# Patient Record
Sex: FEMALE | Race: WHITE | NOT HISPANIC OR LATINO | Employment: OTHER
[De-identification: names, ages, dates, MRNs, and addresses within clinical notes are randomized per-mention and may not be internally consistent; named-entity substitution may affect disease eponyms.]

---

## 2018-10-11 ENCOUNTER — RECORDS - HEALTHEAST (OUTPATIENT)
Dept: ADMINISTRATIVE | Facility: OTHER | Age: 63
End: 2018-10-11

## 2019-10-24 ENCOUNTER — OFFICE VISIT - HEALTHEAST (OUTPATIENT)
Dept: INTERNAL MEDICINE | Facility: CLINIC | Age: 64
End: 2019-10-24

## 2019-10-24 DIAGNOSIS — Z00.00 ROUTINE GENERAL MEDICAL EXAMINATION AT A HEALTH CARE FACILITY: ICD-10-CM

## 2019-10-24 DIAGNOSIS — Z23 ENCOUNTER FOR HERPES ZOSTER VACCINATION: ICD-10-CM

## 2019-10-24 DIAGNOSIS — Z12.2 ENCOUNTER FOR SCREENING FOR LUNG CANCER: ICD-10-CM

## 2019-10-24 DIAGNOSIS — Z72.0 TOBACCO ABUSE: ICD-10-CM

## 2019-10-24 DIAGNOSIS — E78.5 HYPERLIPIDEMIA LDL GOAL <100: ICD-10-CM

## 2019-10-24 DIAGNOSIS — Z78.0 MENOPAUSE: ICD-10-CM

## 2019-10-24 DIAGNOSIS — M41.35 THORACOGENIC SCOLIOSIS OF THORACOLUMBAR REGION: ICD-10-CM

## 2019-10-24 DIAGNOSIS — R05.3 CHRONIC COUGH: ICD-10-CM

## 2019-10-24 DIAGNOSIS — M54.16 LUMBAR RADICULOPATHY: ICD-10-CM

## 2019-10-24 LAB
ALBUMIN SERPL-MCNC: 4.1 G/DL (ref 3.5–5)
ALP SERPL-CCNC: 94 U/L (ref 45–120)
ALT SERPL W P-5'-P-CCNC: 38 U/L (ref 0–45)
ANION GAP SERPL CALCULATED.3IONS-SCNC: 10 MMOL/L (ref 5–18)
AST SERPL W P-5'-P-CCNC: 23 U/L (ref 0–40)
BASOPHILS # BLD AUTO: 0 THOU/UL (ref 0–0.2)
BASOPHILS NFR BLD AUTO: 1 % (ref 0–2)
BILIRUB SERPL-MCNC: 0.6 MG/DL (ref 0–1)
BUN SERPL-MCNC: 13 MG/DL (ref 8–22)
CALCIUM SERPL-MCNC: 9.7 MG/DL (ref 8.5–10.5)
CHLORIDE BLD-SCNC: 104 MMOL/L (ref 98–107)
CHOLEST SERPL-MCNC: 274 MG/DL
CO2 SERPL-SCNC: 27 MMOL/L (ref 22–31)
CREAT SERPL-MCNC: 0.77 MG/DL (ref 0.6–1.1)
EOSINOPHIL # BLD AUTO: 0.3 THOU/UL (ref 0–0.4)
EOSINOPHIL NFR BLD AUTO: 4 % (ref 0–6)
ERYTHROCYTE [DISTWIDTH] IN BLOOD BY AUTOMATED COUNT: 12.2 % (ref 11–14.5)
FASTING STATUS PATIENT QL REPORTED: YES
GFR SERPL CREATININE-BSD FRML MDRD: >60 ML/MIN/1.73M2
GLUCOSE BLD-MCNC: 88 MG/DL (ref 70–125)
HCT VFR BLD AUTO: 43.4 % (ref 35–47)
HDLC SERPL-MCNC: 82 MG/DL
HGB BLD-MCNC: 14.8 G/DL (ref 12–16)
LDLC SERPL CALC-MCNC: 175 MG/DL
LYMPHOCYTES # BLD AUTO: 1.3 THOU/UL (ref 0.8–4.4)
LYMPHOCYTES NFR BLD AUTO: 19 % (ref 20–40)
MCH RBC QN AUTO: 31.7 PG (ref 27–34)
MCHC RBC AUTO-ENTMCNC: 34.2 G/DL (ref 32–36)
MCV RBC AUTO: 93 FL (ref 80–100)
MONOCYTES # BLD AUTO: 0.6 THOU/UL (ref 0–0.9)
MONOCYTES NFR BLD AUTO: 9 % (ref 2–10)
NEUTROPHILS # BLD AUTO: 4.5 THOU/UL (ref 2–7.7)
NEUTROPHILS NFR BLD AUTO: 67 % (ref 50–70)
PLATELET # BLD AUTO: 298 THOU/UL (ref 140–440)
PMV BLD AUTO: 7.7 FL (ref 7–10)
POTASSIUM BLD-SCNC: 4.4 MMOL/L (ref 3.5–5)
PROT SERPL-MCNC: 6.6 G/DL (ref 6–8)
RBC # BLD AUTO: 4.67 MILL/UL (ref 3.8–5.4)
SODIUM SERPL-SCNC: 141 MMOL/L (ref 136–145)
TRIGL SERPL-MCNC: 87 MG/DL
TSH SERPL DL<=0.005 MIU/L-ACNC: 2.67 UIU/ML (ref 0.3–5)
WBC: 6.7 THOU/UL (ref 4–11)

## 2019-10-24 ASSESSMENT — MIFFLIN-ST. JEOR: SCORE: 1409.71

## 2019-10-25 ENCOUNTER — COMMUNICATION - HEALTHEAST (OUTPATIENT)
Dept: INTERNAL MEDICINE | Facility: CLINIC | Age: 64
End: 2019-10-25

## 2019-10-25 ENCOUNTER — HOSPITAL ENCOUNTER (OUTPATIENT)
Dept: MAMMOGRAPHY | Facility: CLINIC | Age: 64
Discharge: HOME | End: 2019-10-25
Attending: INTERNAL MEDICINE

## 2019-10-25 DIAGNOSIS — Z78.0 MENOPAUSE: ICD-10-CM

## 2019-10-25 DIAGNOSIS — E78.5 HYPERLIPIDEMIA LDL GOAL <100: ICD-10-CM

## 2019-10-25 LAB — HCV AB SERPL QL IA: NEGATIVE

## 2019-10-30 ENCOUNTER — HOSPITAL ENCOUNTER (OUTPATIENT)
Dept: CT IMAGING | Facility: HOSPITAL | Age: 64
Discharge: HOME | End: 2019-10-30
Attending: INTERNAL MEDICINE

## 2019-10-30 ENCOUNTER — COMMUNICATION - HEALTHEAST (OUTPATIENT)
Dept: INTERNAL MEDICINE | Facility: CLINIC | Age: 64
End: 2019-10-30

## 2019-10-30 DIAGNOSIS — Z12.2 ENCOUNTER FOR SCREENING FOR LUNG CANCER: ICD-10-CM

## 2019-11-01 ENCOUNTER — RECORDS - HEALTHEAST (OUTPATIENT)
Dept: ADMINISTRATIVE | Facility: OTHER | Age: 64
End: 2019-11-01

## 2019-11-01 ENCOUNTER — RECORDS - HEALTHEAST (OUTPATIENT)
Dept: BONE DENSITY | Facility: CLINIC | Age: 64
End: 2019-11-01

## 2019-11-01 DIAGNOSIS — Z78.0 ASYMPTOMATIC MENOPAUSAL STATE: ICD-10-CM

## 2019-11-04 ENCOUNTER — COMMUNICATION - HEALTHEAST (OUTPATIENT)
Dept: INTERNAL MEDICINE | Facility: CLINIC | Age: 64
End: 2019-11-04

## 2019-12-16 ENCOUNTER — COMMUNICATION - HEALTHEAST (OUTPATIENT)
Dept: INTERNAL MEDICINE | Facility: CLINIC | Age: 64
End: 2019-12-16

## 2019-12-16 DIAGNOSIS — Z23 NEED FOR SHINGLES VACCINE: ICD-10-CM

## 2019-12-17 ENCOUNTER — AMBULATORY - HEALTHEAST (OUTPATIENT)
Dept: NURSING | Facility: CLINIC | Age: 64
End: 2019-12-17

## 2019-12-17 DIAGNOSIS — Z23 NEED FOR SHINGLES VACCINE: ICD-10-CM

## 2020-07-31 ENCOUNTER — OFFICE VISIT - HEALTHEAST (OUTPATIENT)
Dept: FAMILY MEDICINE | Facility: CLINIC | Age: 65
End: 2020-07-31

## 2020-07-31 DIAGNOSIS — I10 BENIGN ESSENTIAL HYPERTENSION: ICD-10-CM

## 2020-07-31 DIAGNOSIS — B96.89 BACTERIAL SINUSITIS: ICD-10-CM

## 2020-07-31 DIAGNOSIS — J32.9 BACTERIAL SINUSITIS: ICD-10-CM

## 2020-10-28 ENCOUNTER — COMMUNICATION - HEALTHEAST (OUTPATIENT)
Dept: PULMONOLOGY | Facility: OTHER | Age: 65
End: 2020-10-28

## 2020-11-08 ENCOUNTER — COMMUNICATION - HEALTHEAST (OUTPATIENT)
Dept: INTERNAL MEDICINE | Facility: CLINIC | Age: 65
End: 2020-11-08

## 2020-11-08 DIAGNOSIS — E78.5 HYPERLIPIDEMIA LDL GOAL <100: ICD-10-CM

## 2020-11-10 RX ORDER — ATORVASTATIN CALCIUM 20 MG/1
TABLET, FILM COATED ORAL
Qty: 30 TABLET | Refills: 11 | Status: SHIPPED | OUTPATIENT
Start: 2020-11-10 | End: 2022-01-05

## 2020-11-25 ENCOUNTER — COMMUNICATION - HEALTHEAST (OUTPATIENT)
Dept: PULMONOLOGY | Facility: OTHER | Age: 65
End: 2020-11-25

## 2021-05-25 ENCOUNTER — RECORDS - HEALTHEAST (OUTPATIENT)
Dept: ADMINISTRATIVE | Facility: CLINIC | Age: 66
End: 2021-05-25

## 2021-05-26 ENCOUNTER — RECORDS - HEALTHEAST (OUTPATIENT)
Dept: ADMINISTRATIVE | Facility: CLINIC | Age: 66
End: 2021-05-26

## 2021-05-27 VITALS
HEART RATE: 63 BPM | OXYGEN SATURATION: 97 % | RESPIRATION RATE: 16 BRPM | TEMPERATURE: 98.9 F | SYSTOLIC BLOOD PRESSURE: 154 MMHG | DIASTOLIC BLOOD PRESSURE: 87 MMHG

## 2021-05-28 ENCOUNTER — RECORDS - HEALTHEAST (OUTPATIENT)
Dept: ADMINISTRATIVE | Facility: CLINIC | Age: 66
End: 2021-05-28

## 2021-05-29 ENCOUNTER — RECORDS - HEALTHEAST (OUTPATIENT)
Dept: ADMINISTRATIVE | Facility: CLINIC | Age: 66
End: 2021-05-29

## 2021-05-31 ENCOUNTER — RECORDS - HEALTHEAST (OUTPATIENT)
Dept: ADMINISTRATIVE | Facility: CLINIC | Age: 66
End: 2021-05-31

## 2021-06-02 NOTE — PATIENT INSTRUCTIONS - HE
1.  64-year-old woman who is reestablishing primary care at [facility] internal medicine after being away for a little over 3 years.  Issues are chronic cough with sputum production in the context of long history of cigarette smoking (at least 35 pack years), chronic low back pain in the context of lumbar disc degeneration and thoracolumbar scoliosis, right-sided lumbar radiculopathy, mild aortic insufficiency seen on echocardiogram January 2016, hyperlipidemia, post menopause, history of colon polyp, preventive health services.    She is fasting this morning, so we will get comprehensive metabolic panel, blood cell counts, TSH thyroid test, lipid profile, also screening hepatitis C serology..  Overdue for screening mammogram so this is ordered.    Vaccines were given today for Prevnar 13 and for tetanus.    Going issue by issue:    2.  Chronic cough sputum production in the context of long history of cigarette smoking of at least 35 pack years.    I believe she probably has chronic bronchitis from smoking, according to the description of her symptoms.  She said she is been coughing and bringing up phlegm for the past 6 months.  I do not think there is any acute infection going on.  Her lungs sound clear.  Oxygen saturation 97% which is normal.    Given her long smoking history, she meets criteria for low-dose lung CT scanning.  This is ordered.  I am also sending her for spirometry to quantify her pulmonary flows and volumes to see if she formally would be diagnosed with COPD.    She did receive the Prevnar 13 vaccination today along with her seasonal flu shot and tetanus booster.  In the next 6 months or so, I would want her to also get the pneumococcal 23 Valent vaccine.    With regards to medical management of her lungs, especially if we diagnosed COPD, of course I want her to stop smoking as soon as possible.  If she is bothered by shortness of breath symptoms, I would put her on an inhaler regimen  starting with tiotropium daily, with albuterol inhaler for rescue use.    3.  Tobacco use.  In 2016 Dr. Orin Cheung did prescribe Chantix, but it was extremely expensive.  I told Ms. Phillips that we could try prescribing Chantix again.  Alternatives are nicotine replacement with patches and/or gum.  Another alternative is bupropion (also known as Wellbutrin or Zyban).    I did ask her to set a stop smoking date.  Her partner also was a smoker, and therefore this probably needs to be a joint project between the 2.    4.  Chronic low back pain in the context of lumbar disc degeneration, thoracolumbar scoliosis, and chronic right-sided lumbar radiculopathy with history of right L5 microdiscectomy performed in 2015.    She has some weakness of her right foot dorsiflexors, which fits with her description of a mild foot drop.  Otherwise she has pretty good strength in her lower extremities.  Her pain is concentrated mostly across her low back.  She has had 2 MRI studies showing extensive degenerative disc disease and facet arthropathy.  Most recent MRI was done at [facility] orthopedics in 2018.  I do not have that report, but I expect it shows similar degenerative changes to what was seen in 2015.    I told her that the best long-term solution for her back is to strengthen her core postural muscles.  Although she is been to physical therapy for her back before, I want to send her again, so that she can re-learn these exercises.  Is also important for her to stop smoking, because smoking harms bone density and remodeling, and likely accelerates degenerative changes.    She does take occasional ibuprofen or naproxen which is fine.  Other helpful measures are massage, moist heat, stretching, and sleeping on a supportive surface.    5.  Mild aortic insufficiency seen on echocardiogram January 2016.  I did not hear an AI murmur today.  At some point we might get another echocardiogram to recheck.    Satisfactory blood pressure  noted today.    6.  Hyperlipidemia with elevated LDL cholesterol of around 150.  Will recheck today.    7.  Post menopause, due for screening mammography.  Menses stopped in her early 50s.  Pap smears were normal in 2013 in 2016, she recalls never having had abnormal Pap smear.  I believe she can stop Pap screening.  No problems with vaginal bleeding or spotting.  We have also ordered a bone density scan.    8.  History of serrated adenoma colon polyp seen in 2016, next colonoscopy 5 years after that which would be 2021.    9.  At risk for osteoporosis given long history of smoking and post menopause.  DEXA scan ordered.    10.  Benign seborrheic keratosis on her back.    11.  Zoster vaccine indicated, will give her Shingrix shot #1 (of 2) today.    12.  Overweight and probably a bit deconditioned.  I like to see her lose about 20 pounds.  She walks about 2 or 3 miles a day.  I would like her to add strength training, especially focused on her back.  That will build lean muscle mass and help with pain while also helping her lose weight, encourage better blood pressure, and also help with blood sugar and lipids.    I like to see her back in 1 month, focus on smoking cessation strategies, also decide on whether any inhalers are needed.

## 2021-06-02 NOTE — PROGRESS NOTES
Office Visit - New Patient   Anat Phillips   64 y.o.  female    Date of visit: 10/24/2019  Physician: Isiah Gallo MD     Assessment and Plan     1.  64-year-old woman who is reestablishing primary care at [facility] general internal medicine after being away for over 3 years.  Issues are chronic cough with sputum production in the context of long history of cigarette smoking (at least 35 pack years), chronic low back pain in the context of lumbar disc degeneration and thoracolumbar scoliosis, right-sided lumbar radiculopathy, mild aortic insufficiency seen on echocardiogram January 2016, hyperlipidemia, post menopause, history of colon polyp, preventive health services.    She is fasting this morning, so we will get comprehensive metabolic panel, blood cell counts, TSH thyroid test, lipid profile, also screening hepatitis C serology..  Overdue for screening mammogram so this is ordered.    Vaccines were given today for Prevnar 13 and for tetanus.    Going issue by issue:    2.  Chronic cough sputum production in the context of long history of cigarette smoking of at least 35 pack years.    I believe she probably has chronic bronchitis from smoking, according to the description of her symptoms.  She said she is been coughing and bringing up phlegm for the past 6 months.  I do not think there is any acute infection going on.  Her lungs sound clear.  Oxygen saturation 97% which is normal.    Given her long smoking history, she meets criteria for low-dose lung CT scanning.  This is ordered.  I am also sending her for spirometry to quantify her pulmonary flows and volumes to see if she formally would be diagnosed with COPD.    She did receive the Prevnar 13 vaccination today along with her seasonal flu shot and tetanus booster.  In the next 6 months or so, I would want her to also get the pneumococcal 23 Valent vaccine.    With regards to medical management of her lungs, especially if we diagnosed COPD, of course I  want her to stop smoking as soon as possible.  If she is bothered by shortness of breath symptoms, I would put her on an inhaler regimen starting with tiotropium daily, with albuterol inhaler for rescue use.    3.  Tobacco use.  In 2016 Dr. Orin Cheung did prescribe Chantix, but it was extremely expensive.  I told Ms. Phillips that we could try prescribing Chantix again.  Alternatives are nicotine replacement with patches and/or gum.  Another alternative is bupropion (also known as Wellbutrin or Zyban).    I did ask her to set a stop smoking date.  Her partner also was a smoker, and therefore this probably needs to be a joint project between the 2.    4.  Chronic low back pain in the context of lumbar disc degeneration, thoracolumbar scoliosis, and chronic right-sided lumbar radiculopathy with history of right L5 microdiscectomy performed in 2015.    She has some weakness of her right foot dorsiflexors, which fits with her description of a mild foot drop.  Otherwise she has pretty good strength in her lower extremities.  Her pain is concentrated mostly across her low back.  She has had 2 MRI studies showing extensive degenerative disc disease and facet arthropathy.  Most recent MRI was done at [facility] orthopedics in 2018.  I do not have that report, but I expect it shows similar degenerative changes to what was seen in 2015.    I told her that the best long-term solution for her back is to strengthen her core postural muscles.  Although she is been to physical therapy for her back before, I want to send her again, so that she can re-learn these exercises.  Is also important for her to stop smoking, because smoking harms bone density and remodeling, and likely accelerates degenerative changes.    She does take occasional ibuprofen or naproxen which is fine.  Other helpful measures are massage, moist heat, stretching, and sleeping on a supportive surface.    5.  Mild aortic insufficiency seen on echocardiogram  January 2016.  I did not hear an AI murmur today.  At some point we might get another echocardiogram to recheck.    Satisfactory blood pressure noted today.    6.  Hyperlipidemia with elevated LDL cholesterol of around 150.  Will recheck today.    7.  Post menopause, due for screening mammography.  Menses stopped in her early 50s.  Pap smears were normal in 2013 in 2016, she recalls never having had abnormal Pap smear.  I believe she can stop Pap screening.  No problems with vaginal bleeding or spotting.  We have also ordered a bone density scan.    8.  History of serrated adenoma colon polyp seen in 2016, next colonoscopy 5 years after that which would be 2021.    9.  At risk for osteoporosis given long history of smoking and post menopause.  DEXA scan ordered.    10.  Benign seborrheic keratosis on her back.    11.  Zoster vaccine indicated, will give her Shingrix shot #1 (of 2) today.    12.  Overweight and probably a bit deconditioned.  I like to see her lose about 20 pounds.  She walks about 2 or 3 miles a day.  I would like her to add strength training, especially focused on her back.  That will build lean muscle mass and help with pain while also helping her lose weight, encourage better blood pressure, and also help with blood sugar and lipids.    I like to see her back in 1 month, focus on smoking cessation strategies, also decide on whether any inhalers are needed.       Chief Complaint   Chief Complaint   Patient presents with     Establish Care     Coughing up yellow phlegm for about 6 months.        History of Present Illness   This 64 y.o. old woman who is reestablishing primary care at [facility] general internal medicine after being away for over 3 years.  Issues are chronic cough with sputum production in the context of long history of cigarette smoking (at least 35 pack years), chronic low back pain in the context of lumbar disc degeneration and thoracolumbar scoliosis, right-sided lumbar  "radiculopathy, mild aortic insufficiency seen on echocardiogram January 2016, hyperlipidemia, post menopause, history of colon polyp, preventive health services.    Last seen in [facility] general internal medicine January 21, 2016.  That was more than 3 years ago.    Cough and phlegm  Yellowish about 6 months  Smoking about a 1 ppd, since age 33 years  Did quit for a year in mid 2000's  Dr Orin Cheung had 2016 varenicline (CHANTIX STARTING MONTH BOX) 0.5 mg (11)- 1 mg (42) tablet; Give with meals and with a full glass of water.     Lumbar Disc Degeneration  \"Terrible\"  [facility]  MR LUMBAR SPINE WO CONTRAST  3/20/2015 3:43 PM  CONCLUSION:  1.  Multilevel degenerative changes in the lumbar spine, as detailed. Dextroconvex curvature of the lumbar spine.  2.  L4-L5 inferiorly extending posterior disc herniation eccentric to the posterior central through posterior right paracentral region with effect upon the right L5 nerve root. Correlate for right L5 nerve root symptoms. Mild to moderate central canal   stenosis.  Subsequently she had microdiscectomy March 2015    Date: 01/29/2016 Start  Indications  Abnormal ECG.   Summary   1. Normal left ventricular size and systolic performance. The ejection   fraction is estimated to be 60%.   2. There is mild aortic insufficiency.    No chest pain, or any symptoms to suggest angina or heart failure.    Hyperlipidemia with lipid profile from January 2016 having elevated cholesterol 251, , HDL 82, triglycerides 76.     She recalls more recent MRI [facility] 2018-- recalls similar degenerative changes, but nothing acute    Has persistent nerve damage Right Leg-- right side foot drop  Nowadays: has pain localized across low back  Pain in hips. Pain right foot  Pinch sensation behind left knee  Leg strength preserved  Walks 2-3 miles a day  Not currently doing any core strengthening exercise.  Occasional aleve and iburofen    Last mammogram 2016  Menses stopped age " 52  Last Pap January 21, 2016, previous one on May 2, 2013.  These were normal.  She told me that she has never had an abnormal Pap smear.    Had colonoscopy 2016, told her 5 years, due 2021  Colonoscopy March 29, 2016 with sessile serrated adenoma in the a sending colon measuring 6 mm    No gastrointestinal symptoms.    Immunization History   Administered Date(s) Administered     DT (pediatric) 08/15/2002     Hep A, historic 05/19/2008     Influenza high dose,seasonal,PF, 65+ yrs 10/24/2019     Influenza,seasonal quad, PF, =/> 6months 01/21/2016     Pneumo Conj 13-V (2010&after) 10/24/2019     Td,adult,historic,unspecified 05/19/2008     Tdap 05/19/2008, 10/24/2019     Review of Systems: A comprehensive review of systems was negative except as noted.     Medications and Allergies   Current Outpatient Medications   Medication Sig Dispense Refill     aspirin 81 mg chewable tablet Chew 81 mg daily.       cetirizine (ZYRTEC) 10 MG tablet Take 10 mg by mouth daily.       GLUC HCL/CSA/MARYANNE HY/HYALUR AC (JOINT SUPPORT ORAL) Take 1 capsule by mouth daily. Joint Support Complex Oral Capsule       Lactobacillus rhamnosus GG (CULTURELLE) 10-15 Billion cell capsule Take 1 capsule by mouth daily.       MULTIVITAMIN (MULTIPLE VITAMIN ORAL) Take 1 tablet by mouth daily.       pseudoephedrine (SUDAFED) 30 MG tablet Take 30 mg by mouth every 4 (four) hours as needed for congestion.       No current facility-administered medications for this visit.      Allergies   Allergen Reactions     Sulfa (Sulfonamide Antibiotics)         Family and Social History   Family History   Problem Relation Age of Onset     Heart disease Father      Heart disease Brother      Breast cancer Niece         Social History     Tobacco Use     Smoking status: Current Every Day Smoker     Packs/day: 0.50     Types: Cigarettes     Smokeless tobacco: Never Used   Substance Use Topics     Alcohol use: Yes     Alcohol/week: 5.8 standard drinks     Types: 7  "Standard drinks or equivalent per week     Frequency: 4 or more times a week     Drinks per session: 1 or 2     Drug use: No        Physical Exam   General Appearance:   Very pleasant, normal mental status, moderately overweight.    /82 (Patient Site: Right Arm, Patient Position: Sitting, Cuff Size: Adult Regular)   Pulse 73   Temp 98.3  F (36.8  C) (Oral)   Resp 16   Ht 5' 9.5\" (1.765 m)   Wt 178 lb (80.7 kg)   SpO2 97%   BMI 25.91 kg/m       General: Alert, in no distress  Skin: + Benign-appearing seborrheic keratosis left upper back  Eyes/nose/throat: Eyes without scleral icterus, eye movements normal, pupils equal and reactive, oropharynx clear, ears with normal TM's  MSK: Neck with good ROM  Lymphatic: Neck without adenopathy or masses  Endocrine: Thyroid with no nodules to palpation  Pulm: Lungs clear to auscultation bilaterally  Cardiac: Heart with regular rate and rhythm, no murmur or gallop  GI: Abdomen soft, nontender. No palpable enlargement of liver or spleen  MSK: Extremities no tenderness or edema  Neuro: Moves all extremities  + Very slight weakness right foot dorsiflexion  Psych: Alert, normal mental status. Normal affect and speech         Additional Information   I spent 60 minutes face time with the patient, with > 50% counseling, explaining and discussing with the patient the issues enumerated in the Assessment and Plan section of this note and answering questions. Afterwards, the patient was given a printout of the AVS, with attention to the content in the Patient Instruction section.       Isiah Gallo MD  Internal Medicine      "

## 2021-06-03 VITALS
SYSTOLIC BLOOD PRESSURE: 132 MMHG | HEIGHT: 70 IN | BODY MASS INDEX: 25.48 KG/M2 | WEIGHT: 178 LBS | RESPIRATION RATE: 16 BRPM | DIASTOLIC BLOOD PRESSURE: 82 MMHG | TEMPERATURE: 98.3 F | OXYGEN SATURATION: 97 % | HEART RATE: 73 BPM

## 2021-06-04 NOTE — TELEPHONE ENCOUNTER
Patient is coming in tomorrow for Shingles #2, last given 10/24/19. Will be 1 week early for the 2 month vaccine. Order pending. Are you okay to give early? Please advise, thanks.

## 2021-06-10 NOTE — PROGRESS NOTES
Assessment & Plan:       1. Bacterial sinusitis  amoxicillin-clavulanate (AUGMENTIN) 875-125 mg per tablet   2. Benign essential hypertension        Medical Decision Making  Patient presents with acute onset left-sided facial and jaw pain most consistent with bacterial sinusitis.  Despite patient noting a mild cough, feel like this is most likely secondary to postnasal drip.  I do not have any concerns for COVID-19 at this time.  Patient does have pain to percussion of the left maxillary sinus and obvious left-sided anterior cervical lymphadenopathy.  No other signs for a soft tissue neck abscess as the skin of the neck is nonerythematous, no significant swelling, and no increased increased warmth to touch.  Did also consider possible TMJ disease, however patient does not have significant pain with anterior palpation of the left ear canal while opening closing the mouth.  Further there are no significant signs for strep pharyngitis or peritonsillar abscess on exam.  Will treat patient with oral antibiotics.  Also recommend she continue her at home prescription of nasal steroids as well as over-the-counter 24-hour antihistamines.  Discussed signs of worsening symptoms and when to follow-up with PCP if no symptom improvement.    Patient initially had symptoms possibly consistent with COVID-19.  Thus, protective precautions were taken including appropriate facemask, face shield, gown, and gloves.    Patient Instructions   You were seen today for a sinus infection.    Symptoms management:  - May use Tylenol or Ibuprofen for discomfort and/or fever if present  - May try saline irrigation to relieve congestion (see instructions below)  - Use of nasal steroids (Flonase) as prescribed  - Take antibiotics as prescribed for the full course (even if symptoms have improved)    Reasons to come back for re-evaluation:  - Develop a fever of 100.4F or current fever worsens  - Troubles seeing or double vision  - Swelling or  redness around one or both eyes  - Sfiff neck  - Symptoms are not improving over the next 5 days    Buffered normal saline nasal irrigation   The benefits   1. Saline (saltwater) washes the mucus and irritants from your nose.   2. The sinus passages are moisturized.   3. Studies have also shown that a nasal irrigation improves cell function (the cells that move the mucus work better).   The recipe   Use a one-quart glass jar that is thoroughly cleansed.   You may use a large medical syringe (30 cc), water pick with an irrigation tip (preferred method), squeeze bottle, or Neti pot. Do not use a baby bulb syringe. The syringe or pick should be sterilized frequently or replaced every two to three weeks to avoid contamination and infection.   Fill with water that has been distilled, previously boiled, or otherwise sterilized. Plain tap water is not recommended, because it is not necessarily sterile.   Add 1 to 1  heaping teaspoons of pickling/roddy salt. Do not use table salt, because it contains a large number of additives.   Add 1 teaspoon of baking soda (pure bicarbonate).   Mix ingredients together, and store at room temperature. Discard after one week.   You may also make up a solution from premixed packets that are commercially prepared specifically for nasal irrigation.   The instructions   Irrigate your nose with saline one to two times per day.   If you have been told to use nasal medication, you should always use your saline solution first. The nasal medication is much more effective when sprayed onto clean nasal membranes, and the spray will reach deeper into the nose.   Pour the amount of fluid you plan to use into a clean bowl. Do not put your used syringe back into the storage container, because it contaminates your solution.   You may warm the solution slightly in the microwave, but be sure that the solution is not hot.   Bend over the sink (some people do this in the shower) and squirt the solution  into each side of your nose, aiming the stream toward the back of your head, not the top of your head. The solution should flow into one nostril and out of the other, but it will not harm you if you swallow a little.   Some people experience a little burning sensation the first few times they use buffered saline solution, but this usually goes away after they adapt to it.             Subjective:       Anat Phillips is a 65 y.o. female here for evaluation of left-sided jaw pain.  Onset of symptoms was 1 week ago gradual worsening since then.  Associated symptoms include occasional dry cough, sore throat, sinus congestion, swelling underneath the left side of the neck, and pain while opening the mouth.  Patient otherwise denies shortness of breath, fevers, and ear pain.  She notes that the congestion pressure feels worse over the left maxillary sinus.  She is still able to drink fluids and eat food as needed.    The following portions of the patient's history were reviewed and updated as appropriate: allergies, current medications and problem list.    Review of Systems  Pertinent items are noted in HPI.     Allergies  Allergies   Allergen Reactions     Sulfa (Sulfonamide Antibiotics)        Family History   Problem Relation Age of Onset     Heart disease Father      Heart disease Brother      Breast cancer Niece 30         at 32       Social History     Socioeconomic History     Marital status: Single     Spouse name: None     Number of children: None     Years of education: None     Highest education level: None   Occupational History     Employer: RETIRED   Social Needs     Financial resource strain: None     Food insecurity     Worry: None     Inability: None     Transportation needs     Medical: None     Non-medical: None   Tobacco Use     Smoking status: Current Every Day Smoker     Packs/day: 0.50     Types: Cigarettes     Smokeless tobacco: Never Used   Substance and Sexual Activity     Alcohol use: Yes      Alcohol/week: 5.8 standard drinks     Types: 7 Standard drinks or equivalent per week     Frequency: 4 or more times a week     Drinks per session: 1 or 2     Drug use: No     Sexual activity: None   Lifestyle     Physical activity     Days per week: None     Minutes per session: None     Stress: None   Relationships     Social connections     Talks on phone: None     Gets together: None     Attends Caodaism service: None     Active member of club or organization: None     Attends meetings of clubs or organizations: None     Relationship status: None     Intimate partner violence     Fear of current or ex partner: None     Emotionally abused: None     Physically abused: None     Forced sexual activity: None   Other Topics Concern     None   Social History Narrative     None         Objective:       /87 (Patient Site: Left Arm)   Pulse 63   Temp 98.9  F (37.2  C)   Resp 16   SpO2 97%   General appearance: alert, appears stated age, cooperative, no distress and non-toxic  Head: Normocephalic, without obvious abnormality, atraumatic, Sinus tender to percussion over the left maxillary sinus; no tenderness or swelling over the parotid, patient does have some mild tenderness in the inferior aspect of the middle left jaw  Ears: TMs intact with mild to moderate mucoid fluid and bulging, no erythema, external ears normal; no tenderness to anterior pressure applied to the ear canal opening closing the mouth  Nose: no discharge  Throat: Posterior pharynx is mildly erythematous, no tonsillar swelling or exudate, mucous membranes moist, lips and tongue normal  Neck: Moderate left-sided anterior cervical lymphadenopathy, supple, symmetrical, trachea midline  Lungs: clear to auscultation bilaterally  Heart: regular rate and rhythm, S1, S2 normal, no murmur, click, rub or gallop

## 2021-06-10 NOTE — PATIENT INSTRUCTIONS - HE
You were seen today for a sinus infection.    Symptoms management:  - May use Tylenol or Ibuprofen for discomfort and/or fever if present  - May try saline irrigation to relieve congestion (see instructions below)  - Use of nasal steroids (Flonase) as prescribed  - Take antibiotics as prescribed for the full course (even if symptoms have improved)    Reasons to come back for re-evaluation:  - Develop a fever of 100.4F or current fever worsens  - Troubles seeing or double vision  - Swelling or redness around one or both eyes  - Sfiff neck  - Symptoms are not improving over the next 5 days    Buffered normal saline nasal irrigation   The benefits   1. Saline (saltwater) washes the mucus and irritants from your nose.   2. The sinus passages are moisturized.   3. Studies have also shown that a nasal irrigation improves cell function (the cells that move the mucus work better).   The recipe   Use a one-quart glass jar that is thoroughly cleansed.   You may use a large medical syringe (30 cc), water pick with an irrigation tip (preferred method), squeeze bottle, or Neti pot. Do not use a baby bulb syringe. The syringe or pick should be sterilized frequently or replaced every two to three weeks to avoid contamination and infection.   Fill with water that has been distilled, previously boiled, or otherwise sterilized. Plain tap water is not recommended, because it is not necessarily sterile.   Add 1 to 1  heaping teaspoons of pickling/roddy salt. Do not use table salt, because it contains a large number of additives.   Add 1 teaspoon of baking soda (pure bicarbonate).   Mix ingredients together, and store at room temperature. Discard after one week.   You may also make up a solution from premixed packets that are commercially prepared specifically for nasal irrigation.   The instructions   Irrigate your nose with saline one to two times per day.   If you have been told to use nasal medication, you should always use your  saline solution first. The nasal medication is much more effective when sprayed onto clean nasal membranes, and the spray will reach deeper into the nose.   Pour the amount of fluid you plan to use into a clean bowl. Do not put your used syringe back into the storage container, because it contaminates your solution.   You may warm the solution slightly in the microwave, but be sure that the solution is not hot.   Bend over the sink (some people do this in the shower) and squirt the solution into each side of your nose, aiming the stream toward the back of your head, not the top of your head. The solution should flow into one nostril and out of the other, but it will not harm you if you swallow a little.   Some people experience a little burning sensation the first few times they use buffered saline solution, but this usually goes away after they adapt to it.

## 2021-06-12 NOTE — TELEPHONE ENCOUNTER
RN cannot approve Refill Request    RN can NOT refill this medication Protocol failed and NO refill given. Last office visit: Visit date not found Last Physical: 10/24/2019 Last MTM visit: Visit date not found Last visit same specialty: Visit date not found.  Next visit within 3 mo: Visit date not found  Next physical within 3 mo: Visit date not found      Sabine Anand, [facility] Triage/Med Refill 11/10/2020    Requested Prescriptions   Pending Prescriptions Disp Refills     atorvastatin (LIPITOR) 20 MG tablet [Pharmacy Med Name: Atorvastatin Calcium Oral Tablet 20 MG] 30 tablet 0     Sig: TAKE ONE TABLET BY MOUTH ONE TIME DAILY       Statins Refill Protocol (Hmg CoA Reductase Inhibitors) Failed - 11/8/2020  9:11 AM        Failed - PCP or prescribing provider visit in past 12 months      Last office visit with prescriber/PCP: Visit date not found OR same dept: Visit date not found OR same specialty: Visit date not found  Last physical: 10/24/2019 Last MTM visit: Visit date not found   Next visit within 3 mo: Visit date not found  Next physical within 3 mo: Visit date not found  Prescriber OR PCP: Isiah Gallo MD  Last diagnosis associated with med order: 1. Hyperlipidemia LDL goal <100  - atorvastatin (LIPITOR) 20 MG tablet [Pharmacy Med Name: Atorvastatin Calcium Oral Tablet 20 MG]; TAKE ONE TABLET BY MOUTH ONE TIME DAILY   Dispense: 30 tablet; Refill: 0    If protocol passes may refill for 12 months if within 3 months of last provider visit (or a total of 15 months).

## 2021-06-16 ENCOUNTER — OFFICE VISIT - HEALTHEAST (OUTPATIENT)
Dept: PODIATRY | Facility: CLINIC | Age: 66
End: 2021-06-16

## 2021-06-16 DIAGNOSIS — M21.371 RIGHT FOOT DROP: ICD-10-CM

## 2021-06-16 DIAGNOSIS — G62.9 PERIPHERAL POLYNEUROPATHY: ICD-10-CM

## 2021-06-16 PROBLEM — E78.5 HYPERLIPIDEMIA LDL GOAL <100: Status: ACTIVE | Noted: 2019-10-24

## 2021-06-16 PROBLEM — R05.3 CHRONIC COUGH: Status: ACTIVE | Noted: 2019-10-24

## 2021-06-16 PROBLEM — M54.16 LUMBAR RADICULOPATHY: Status: ACTIVE | Noted: 2019-10-24

## 2021-06-16 PROBLEM — Z72.0 TOBACCO ABUSE: Status: ACTIVE | Noted: 2019-10-24

## 2021-06-16 PROBLEM — M41.35 THORACOGENIC SCOLIOSIS OF THORACOLUMBAR REGION: Status: ACTIVE | Noted: 2019-10-24

## 2021-06-16 RX ORDER — ZINC GLUCONATE 50 MG
TABLET ORAL
Status: SHIPPED | COMMUNITY
Start: 2021-06-16 | End: 2022-06-09

## 2021-06-16 ASSESSMENT — MIFFLIN-ST. JEOR: SCORE: 1472.08

## 2021-06-21 NOTE — LETTER
Letter by Haylee Patel RN at      Author: Haylee Patel RN Service: -- Author Type: --    Filed:  Encounter Date: 11/25/2020 Status: (Other)         Anat Phillips  [address] [address]      11/25/20      Dear Anat,    Your annual lung cancer screening scan is overdue. We have attempted to reach you without success. If you remain interested please call your primary care provider to schedule an office visit so they can do the screening.  We will make no further attempts to call you to schedule at this point if we do not hear from you and assume you are no longer interested in the screening.     Sincerely,  [facility] ([facility]) Lung Cancer Screening Program

## 2021-06-21 NOTE — LETTER
Letter by Haylee Patel RN at      Author: Haylee Patel RN Service: -- Author Type: --    Filed:  Encounter Date: 10/28/2020 Status: (Other)       Anat MUÑIZ Phillips  [address] [address]      10/28/20      Dear Remington Coppola time for your yearly exam to check for lung cancer.   Please call your primary care doctor to schedule a brief visit so your Low Dose CT scan can be ordered at the time of that appointment. Your scan needs to be done within 30 days of your office visit.      You should have a yearly exam if:  Youre age 55 to 80 (55 to 77, if youre on Medicare)  Youve smoked a pack a day for at least 30 years (or 2 packs a day for at least 15 years)  If you no longer smoke, its been less than 15 years since you quit     These exams work best when done every year. They are good at finding lung cancer early, but they cant find all lung cancers. If you develop any symptoms (shortness of breath, chest pain, coughing up blood), call your doctor right away.     If you would like help to quit smoking, please talk with your doctor during your next visit. Or, call [employer] at [phone].        Sincerely,  [facility] ([facility]) Lung Cancer Screening Program

## 2021-06-26 ENCOUNTER — HEALTH MAINTENANCE LETTER (OUTPATIENT)
Age: 66
End: 2021-06-26

## 2021-06-26 NOTE — PATIENT INSTRUCTIONS - HE
Surgery Information    **ALL [facility] PAPERWORK IS TO BE FAXED -[phone], IT WILL BE PROCESSED AFTER SURGERY IS COMPLETE.        Please call one of the [facility] locations below to schedule an appointment. If you received a prescription please bring it with you to your appointment. Some locations are limited to what they carry.    Office Locations    [facility]  [address] [place] [address]  Home Medical Equipment, [address]   Phone: [phone]   Orthotics and Prosthetics, [address]   Phone: [phone]    [facility]  Home Medical Equipment  [address], [place] [address]   Phone: [phone]    Orthotics and Prosthetics ([facility])    [address], [place] [address]  Phone: [phone]    [facility]  [address]   [place] [address]   Phone: [phone]    [facility]  [address]  [place] [address]  Phone: [phone]    [facility]   [address]  [place] [address]  Phone: [phone]    [facility]  [address]  [place] [address]  Phone: [phone]    [facility]  [address]  [address]  [place] [address]  Phone: [phone]    [place]   [address] [place] [address]   Phone: [phone]

## 2021-06-26 NOTE — PROGRESS NOTES
FOOT AND ANKLE SURGERY/PODIATRY CONSULT NOTE         ASSESSMENT:   Peripheral neuropathy right lower extremity      TREATMENT:  I informed the patient that her right foot pain is related to her problems with L4 and L5.  This is causing some peripheral neuropathy at the level of her right foot.  I informed the patient that I do recommend an AFO to assist with her gait due to the muscle weakness on the right lower extremity.  The patient is to return to the clinic as needed.  I informed the patient that there is no evidence of any ganglion cyst on the dorsal aspect of the right foot at this time.        HPI: Anat Phillips presented to the clinic today complaining of a recurrent ganglion cyst on the top of her right foot.  The patient also stated that she has pain involving the top of her right foot.  The pain can radiate into the arch of her right foot and across the top of her foot to the outside of her right ankle.  She indicated that this pain is intermittent.  It is mildly aggravated with weightbearing and ambulation.  She also indicated that she has some weakness of her right lower limb.  She has had problems with L4 and L5 in the past.  She had surgical procedures performed on her back.  Since that time she has numbness and weakening of her right lower leg.  It has altered her gait.  She denies any recent redness or swelling.  She is never had any trauma to her right lower extremity.  She stated that she has no pain while resting.    No past medical history on file.    Past Surgical History:   Procedure Laterality Date     L5 Radiculopathy  March 2015       Allergies   Allergen Reactions     Sulfa (Sulfonamide Antibiotics)          Current Outpatient Medications:      atorvastatin (LIPITOR) 20 MG tablet, TAKE ONE TABLET BY MOUTH ONE TIME DAILY , Disp: 30 tablet, Rfl: 11     cetirizine (ZYRTEC) 10 MG tablet, Take 10 mg by mouth daily., Disp: , Rfl:      MULTIVITAMIN (MULTIPLE VITAMIN ORAL), Take 1 tablet by mouth  daily., Disp: , Rfl:      aspirin 81 mg chewable tablet, Chew 81 mg daily., Disp: , Rfl:      calcium carbonate/vitamin D3 (CALCIUM 600 WITH VITAMIN D3 ORAL), , Disp: , Rfl:      GLUC HCL/CSA/MARYANNE HY/HYALUR AC (JOINT SUPPORT ORAL), Take 1 capsule by mouth daily. Joint Support Complex Oral Capsule, Disp: , Rfl:      Lactobacillus rhamnosus GG (CULTURELLE) 10-15 Billion cell capsule, Take 1 capsule by mouth daily., Disp: , Rfl:      pseudoephedrine (SUDAFED) 30 MG tablet, Take 30 mg by mouth every 4 (four) hours as needed for congestion., Disp: , Rfl:      zinc gluconate 50 mg tablet, , Disp: , Rfl:     Family History   Problem Relation Age of Onset     Heart disease Father      Heart disease Brother      Breast cancer Niece 30         at 32       Social History     Socioeconomic History     Marital status:      Spouse name: Not on file     Number of children: Not on file     Years of education: Not on file     Highest education level: Not on file   Occupational History     Employer: RETIRED   Social Needs     Financial resource strain: Not on file     Food insecurity     Worry: Not on file     Inability: Not on file     Transportation needs     Medical: Not on file     Non-medical: Not on file   Tobacco Use     Smoking status: Current Every Day Smoker     Packs/day: 0.50     Types: Cigarettes     Smokeless tobacco: Never Used   Substance and Sexual Activity     Alcohol use: Yes     Alcohol/week: 5.8 standard drinks     Types: 7 Standard drinks or equivalent per week     Frequency: 4 or more times a week     Drinks per session: 1 or 2     Drug use: No     Sexual activity: Not on file   Lifestyle     Physical activity     Days per week: Not on file     Minutes per session: Not on file     Stress: Not on file   Relationships     Social connections     Talks on phone: Not on file     Gets together: Not on file     Attends Restorationist service: Not on file     Active member of club or organization: Not on file      Attends meetings of clubs or organizations: Not on file     Relationship status: Not on file     Intimate partner violence     Fear of current or ex partner: Not on file     Emotionally abused: Not on file     Physically abused: Not on file     Forced sexual activity: Not on file   Other Topics Concern     Not on file   Social History Narrative     Not on file       Review of Systems - Patient denies fever, chills, rash, wound, stiffness, limping, numbness, weakness, heart burn, blood in stool, chest pain with activity, calf pain when walking, shortness of breath with activity, chronic cough, easy bleeding/bruising, swelling of ankles, excessive thirst, fatigue, depression, anxiety.  Patient admits to right foot pain and numbness and weakness of the right lower leg.  Recurring ganglion cyst right foot.      OBJECTIVE:  Appearance: alert, well appearing, and in no distress.    There were no vitals filed for this visit.    BMI= There is no height or weight on file to calculate BMI.    General appearance: Patient is alert and fully cooperative with history & exam.  No sign of distress is noted during the visit.  Psychiatric: Affect is pleasant & appropriate.  Patient appears motivated to improve health.  Respiratory: Breathing is regular & unlabored while sitting.  HEENT: Hearing is intact to spoken word.  Speech is clear.  No gross evidence of visual impairment that would impact ambulation.    Vascular: Dorsalis pedis and posterior tibial pulses are palpable. There is no pedal hair growth  bilaterally.  CFT < 3 sec from anterior tibial surface to distal digits bilaterally. There is no appreciable edema noted.  Dermatologic: Turgor and texture are within normal limits. No coloration or temperature changes. No primary or secondary lesions noted.  Neurologic: All epicritic and proprioceptive sensations are grossly intact right lower extremity.  Muscle weakness right lower extremity.  Mild loss of protective sensation  left right foot..  Musculoskeletal: All active and passive ankle, subtalar, midtarsal, and 1st MPJ range of motion are grossly intact without pain or crepitus, with the exception of none. Manual muscle strength is within normal limits left lower extremity and diminished right lower extremity in all compartments. All dorsiflexors, plantarflexors, invertors, evertors are intact bilaterally.  No tenderness present to right foot on palpation. Tenderness to right foot with range of motion. Calf is soft/non-tender without warmth/induration    Imaging:         No results found.       Ke Albert; MIRIAM  [facility] Ankle Surgery/Podiatry

## 2021-07-06 VITALS
BODY MASS INDEX: 27.2 KG/M2 | WEIGHT: 190 LBS | TEMPERATURE: 99.2 F | HEART RATE: 67 BPM | HEIGHT: 70 IN | OXYGEN SATURATION: 99 %

## 2021-10-16 ENCOUNTER — HEALTH MAINTENANCE LETTER (OUTPATIENT)
Age: 66
End: 2021-10-16

## 2022-02-05 ENCOUNTER — HEALTH MAINTENANCE LETTER (OUTPATIENT)
Age: 67
End: 2022-02-05

## 2022-05-02 ENCOUNTER — HOSPITAL ENCOUNTER (OUTPATIENT)
Facility: CLINIC | Age: 67
End: 2022-05-02
Attending: ORTHOPAEDIC SURGERY | Admitting: ORTHOPAEDIC SURGERY
Payer: COMMERCIAL

## 2022-05-02 ENCOUNTER — TRANSFERRED RECORDS (OUTPATIENT)
Dept: HEALTH INFORMATION MANAGEMENT | Facility: CLINIC | Age: 67
End: 2022-05-02
Payer: COMMERCIAL

## 2022-06-09 ENCOUNTER — OFFICE VISIT (OUTPATIENT)
Dept: INTERNAL MEDICINE | Facility: CLINIC | Age: 67
End: 2022-06-09
Payer: COMMERCIAL

## 2022-06-09 VITALS
HEIGHT: 70 IN | HEART RATE: 76 BPM | DIASTOLIC BLOOD PRESSURE: 76 MMHG | SYSTOLIC BLOOD PRESSURE: 124 MMHG | BODY MASS INDEX: 28.27 KG/M2 | OXYGEN SATURATION: 96 % | WEIGHT: 197.5 LBS

## 2022-06-09 DIAGNOSIS — Z13.1 SCREENING FOR DIABETES MELLITUS: ICD-10-CM

## 2022-06-09 DIAGNOSIS — M54.16 LUMBAR RADICULOPATHY: ICD-10-CM

## 2022-06-09 DIAGNOSIS — E78.5 HYPERLIPIDEMIA LDL GOAL <100: ICD-10-CM

## 2022-06-09 DIAGNOSIS — E66.3 OVERWEIGHT (BMI 25.0-29.9): ICD-10-CM

## 2022-06-09 DIAGNOSIS — Z72.0 TOBACCO ABUSE: ICD-10-CM

## 2022-06-09 DIAGNOSIS — Z01.818 PREOPERATIVE EXAMINATION: Primary | ICD-10-CM

## 2022-06-09 DIAGNOSIS — M51.379 DEGENERATION OF LUMBAR OR LUMBOSACRAL INTERVERTEBRAL DISC: ICD-10-CM

## 2022-06-09 LAB
ATRIAL RATE - MUSE: 70 BPM
DIASTOLIC BLOOD PRESSURE - MUSE: NORMAL MMHG
INTERPRETATION ECG - MUSE: NORMAL
P AXIS - MUSE: NORMAL DEGREES
PR INTERVAL - MUSE: NORMAL MS
QRS DURATION - MUSE: 122 MS
QT - MUSE: 386 MS
QTC - MUSE: 416 MS
R AXIS - MUSE: 50 DEGREES
SYSTOLIC BLOOD PRESSURE - MUSE: NORMAL MMHG
T AXIS - MUSE: 62 DEGREES
VENTRICULAR RATE- MUSE: 70 BPM

## 2022-06-09 PROCEDURE — 99215 OFFICE O/P EST HI 40 MIN: CPT | Performed by: INTERNAL MEDICINE

## 2022-06-09 PROCEDURE — 93010 ELECTROCARDIOGRAM REPORT: CPT | Performed by: INTERNAL MEDICINE

## 2022-06-09 PROCEDURE — 93005 ELECTROCARDIOGRAM TRACING: CPT | Performed by: INTERNAL MEDICINE

## 2022-06-09 RX ORDER — IBUPROFEN 200 MG
800 TABLET ORAL EVERY 6 HOURS PRN
Status: ON HOLD | COMMUNITY
End: 2022-07-22

## 2022-06-09 NOTE — PATIENT INSTRUCTIONS
The proposed surgical procedure is considered INTERMEDIATE risk.    I recommend that she postpone the lumbar spine surgery that is currently scheduled for June 24, 2022 (3 vertebra fusion L4-S1) to be done by Dr. Sabas Kimball of [facility], because I think we need a few months to make sure that Anat's overall health is in good shape, include cardiac evaluation (detailed below), and I also want her to get tobacco-free for at least 3 months continuously, before undergoing spine surgery.     My last visit with Anat was in October 2019, more than 2 years ago, and she has not been back to primary care since then.    I believe she may have indication for spine surgery to treat lumbar spinal stenosis.  She has a lot of will power, and has still been able to do her household chores, for example yesterday mowing her 0.2 acre lawn, using a self-propelled walk behind push mower, and she still walks her dog 2 miles a day.  Although her mobility still seems pretty good, she reports a lot of discomfort, and she wants to be even more physically active to help her lose weight.  She told me that she tried physical therapy and that it did help a little.    Anat was disappointed that I recommended postponing surgery, but she understood the rationale for taking this step.    Her electrocardiogram done today June 9, 2022 is a borderline EKG, she has an abnormally short MN interval, which to me suggest the possibility of an ectopic atrial focus, and more concerning, she reports.  Episodes of palpitations and lightheadedness that have occurred every few weeks or so, at rest, in the context of powerful cardiovascular risk factors of longstanding heavy cigarette smoking, and hyperlipidemia (did start atorvastatin 2019)    I am going to order a comprehensive set of blood test for her  She is going to come in for fasting blood test on a future morning, at which time we will check her lipid panel, A1c test for diabetes (since she is gained  weight), comprehensive metabolic panel, blood cell counts, TSH thyroid test.    Because of her borderline electrocardiogram, risk factors, and report of palpitations (but not angina or heart failure), I would order a cardiac stress test for her.  She believes she would be able to walk on a treadmill, also therefore I ordered nuclear treadmill test.    I am prescribing Chantix for her to get started stopping smoking.  Her wife Pastora also smokes, and Anat is quite aware that they both need to stop smoking in order for either of them to be successful.    Anat is also dealing with some stressors in terms of taking care of of her mother-in-law    RECOMMENDATION:  Surgery is NOT recommended at this time, until further medical evaluation is complete, and she has stopped smoking for at least 3 months.

## 2022-06-09 NOTE — PROGRESS NOTES
[facility]  [address] [address]  Phone: [phone]  Fax: [phone]  Primary Provider: Isiah Bernal  Pre-op Performing Provider: ISIAH BERNAL    Today's date: 6/9/2022    Anat Phillips is a 67 year old female who presents for a preoperative evaluation.    Surgical Information:  Surgery/Procedure: Lumbar 4 to sacral 1 interbody and posterolateral fusion minimally invasive versus open  Surgery Location: [facility]  Surgeon: Dr. Sabas Kimball, [facility], fax [phone]  Surgery Date: 6/24/2022  Time of Surgery: 12:20 pm  Where patient plans to recover: At home with family  Fax number for surgical facility:    Type of Anesthesia Anticipated: General    Assessment & Plan     The proposed surgical procedure is considered INTERMEDIATE risk.    I recommend that she postpone the lumbar spine surgery that is currently scheduled for June 24, 2022 (3 vertebra fusion L4-S1) proposed by Dr. Sabas Kimball of [facility], because I think we need a few months to make sure that Anat's overall health is in good shape, include cardiac evaluation (detailed below), and I also want her to get tobacco-free for at least 3 months continuously, before undergoing spine surgery.     My last visit with Anat was in October 2019, more than 2 years ago, and she has not been back to primary care since then.    I believe she may have indication for spine surgery to treat symptomatic lumbar spinal stenosis with radiculopathy symptoms.  She has a lot of will power, and has still been able to do her household chores, for example yesterday mowing her 0.2 acre lawn, using a self-propelled walk behind mower, and she still walks her dog 2 miles a day.  Although her mobility still seems pretty good, she reports a lot of discomfort, and she wants to be even more physically active to help her lose weight.  She told me that she tried physical therapy and  that it did help a little.    Anat was disappointed that I recommended postponing surgery, but she understood the rationale for taking this step.    Her electrocardiogram done today June 9, 2022 is a borderline EKG, she has an abnormally short VT interval, which to me suggest the possibility of an ectopic atrial focus, and more concerning, she reports.  Episodes of palpitations and lightheadedness that have occurred every few weeks or so, at rest, in the context of powerful cardiovascular risk factors of longstanding heavy cigarette smoking, and hyperlipidemia (did start atorvastatin 2019)    I am going to order a comprehensive set of blood test for her  She is going to come in for fasting blood test on a future morning, at which time we will check her lipid panel, A1c test for diabetes (since she is gained weight), comprehensive metabolic panel, blood cell counts, TSH thyroid test.    Because of her borderline electrocardiogram, risk factors, and report of palpitations (but not angina or heart failure), I would order a cardiac stress test for her.  She believes she would be able to walk on a treadmill, also therefore I ordered nuclear treadmill test.    I am prescribing Chantix for her to get started stopping smoking.  Her wife Pastora also smokes, and Anat is quite aware that they both need to stop smoking in order for either of them to be successful.    Anat is also dealing with some stressors in terms of taking care of of her mother-in-law    RECOMMENDATION:  Surgery is NOT recommended at this time, until further medical evaluation is complete, and she has stopped smoking for at least 3 months.      Subjective     HPI related to upcoming procedure:     67-year-old woman    Chronic low back pain in the context of lumbar disc degeneration, thoracolumbar scoliosis, and chronic right-sided lumbar radiculopathy with history of right L5 microdiscectomy performed in 2015  She has had weakness of her right foot  dorsiflexors, which fits with her description of a mild foot drop.    MRI studies have shown extensive degenerative disc disease and facet arthropathy      Probable COPD, chronic bronchitis type, chronic cough, sputum production in the context of long history of cigarette smoking of at least 35 pack years that is still ongoing about 1 pack/day as of June 2022    10- screening lung CT  IMPRESSION:  CONCLUSION:  1.  Scattered pulmonary nodules measure up to 4 mm. Follow-up recommendations are below.  2.  There is bronchial wall thickening which can be seen with edema or bronchitis.  3.   There is scarring in the upper lung zones. This is nonspecific though may be related to prior infection.     Tobacco use, about 1 ppd  In 2016 Dr. Orin Cheung did prescribe Chantix, but it was extremely expensive.  I told Ms. Phillips that we could try prescribing Chantix again.  Alternatives are nicotine replacement with patches and/or gum.  Another alternative is bupropion (also known as Wellbutrin or Zyban)     Mild aortic insufficiency seen on echocardiogram January 2016.  I did not hear an AI murmur today.  At some point we might get another echocardiogram to recheck.    Satisfactory blood pressure noted today.    Hyperlipidemia with elevated LDL cholesterol  10- pretreatment  LDL Cholesterol Calculated <=129 mg/dL 175 High    In response to that, started on atorvastatin 20 mg a day that I prescribed, and she is taking consistently    Post menopause, due for screening mammography  Menses stopped in her early 50s.  Pap smears were normal in 2013 in 2016, she recalls never having had abnormal Pap smear.  I believe she can stop Pap screening.  No problems with vaginal bleeding or spotting.  We have also ordered a bone density scan.     History of serrated adenoma colon polyp seen in 2016, next colonoscopy 5 years after that which would be 2021-- overdue     Osteopenia  11-2-2019  1. The spine bone density L1-L4 with  T-score -1.4 and significant decline of 14.6 % compared to 2004.  2. Femoral bone densities show left total hip T- score -1.4 and right total hip T-score -1.2 and significant decline of 17% on the left hip and 13.8% on the right hip compared to 2004.  3. Trabecular bone score indicates poor trabecular bone architecture.  64 y.o. female with LOW BONE DENSITY (OSTEOPENIA) and MODERATE fracture risk, adjusted for the TBS, with major osteoporotic fracture risk 10.8% and hip fracture risk 1.8%.     Benign seborrheic keratosis on her back.      Zoster vaccine indicated, will give her Shingrix shot #1 (of 2) today.     Overweight and deconditioned  Wt Readings from Last 5 Encounters:   06/09/22 89.6 kg (197 lb 8 oz)   06/16/21 86.2 kg (190 lb)   10/24/19 80.7 kg (178 lb)   01/21/16 78.1 kg (172 lb 1.6 oz)   03/26/15 76.8 kg (169 lb 4.8 oz)        Immunization History   Administered Date(s) Administered     COVID-19,PF,Pfizer (12+ Yrs) 03/06/2021, 03/27/2021, 12/20/2021     DT (PEDS <7y) 08/15/2002     HepA, Unspecified 05/19/2008     HepA-Adult 05/19/2008     Influenza (High Dose) 3 valent vaccine 10/24/2019     Influenza (IIV3) PF 09/12/2012     Influenza Vaccine IM > 6 months Valent IIV4 (Alfuria,Fluzone) 01/21/2016     Pneumo Conj 13-V (2010&after) 10/24/2019     Td,adult,historic,unspecified 05/19/2008     Tdap (Adacel,Boostrix) 05/19/2008, 10/24/2019     Zoster vaccine recombinant adjuvanted (SHINGRIX) 10/24/2019, 12/17/2019         Preop Questions 6/9/2022   1. Have you ever had a heart attack or stroke? No   2. Have you ever had surgery on your heart or blood vessels, such as a stent placement, a coronary artery bypass, or surgery on an artery in your head, neck, heart, or legs? No   3. Do you have chest pain with activity? No   4. Do you have a history of  heart failure? No   5. Do you currently have a cold, bronchitis or symptoms of other infection? No   6. Do you have a cough, shortness of breath, or wheezing?  YES -    7. Do you or anyone in your family have previous history of blood clots? UNKNOWN -   8. Do you or does anyone in your family have a serious bleeding problem such as prolonged bleeding following surgeries or cuts? No   9. Have you ever had problems with anemia or been told to take iron pills? No   10. Have you had any abnormal blood loss such as black, tarry or bloody stools, or abnormal vaginal bleeding? No   11. Have you ever had a blood transfusion? No   12. Are you willing to have a blood transfusion if it is medically needed before, during, or after your surgery? Yes   13. Have you or any of your relatives ever had problems with anesthesia? No   14. Do you have sleep apnea, excessive snoring or daytime drowsiness? No   15. Do you have any artifical heart valves or other implanted medical devices like a pacemaker, defibrillator, or continuous glucose monitor? No   16. Do you have artificial joints? No   17. Are you allergic to latex? No     Review of Systems  CONSTITUTIONAL: NEGATIVE for fever, chills, change in weight  INTEGUMENTARY/SKIN: NEGATIVE for worrisome rashes, moles or lesions  EYES: NEGATIVE for vision changes or irritation  ENT/MOUTH: NEGATIVE for ear, mouth and throat problems  RESP:NEGATIVE for significant cough or SOB and Cough and sputum  CV: Palpitations  GI: NEGATIVE for nausea, abdominal pain, heartburn, or change in bowel habits  : NEGATIVE for frequency, dysuria, or hematuria  MUSCULOSKELETAL: NEGATIVE for significant arthralgias or myalgia  NEURO: Back pain and right foot drop  ENDOCRINE: NEGATIVE for temperature intolerance, skin/hair changes  HEME: NEGATIVE for bleeding problems  PSYCHIATRIC: NEGATIVE for changes in mood or affect    Patient Active Problem List    Diagnosis Date Noted     Thoracogenic scoliosis of thoracolumbar region 10/24/2019     Priority: Medium     Hyperlipidemia LDL goal <100 10/24/2019     Priority: Medium     Lumbar radiculopathy 10/24/2019     Priority:  "Medium     Tobacco abuse 10/24/2019     Priority: Medium     Chronic cough 10/24/2019     Priority: Medium     Lumbar Disc Degeneration      Priority: Medium     Created by Conversion          No past medical history on file.  Past Surgical History:   Procedure Laterality Date     IR LUMBAR EPIDURAL STEROID INJECTION  10/12/2004     OTHER SURGICAL HISTORY  March 2015    L5 Radiculopathy     Current Outpatient Medications   Medication Sig Dispense Refill     Aspirin 81 MG CAPS [ASPIRIN 81 MG CHEWABLE TABLET] Chew 81 mg daily.       atorvastatin (LIPITOR) 20 MG tablet TAKE ONE TABLET BY MOUTH ONE TIME DAILY 90 tablet 1     calcium carbonate/vitamin D3 (CALCIUM 600 WITH VITAMIN D3 ORAL) [CALCIUM CARBONATE/VITAMIN D3 (CALCIUM 600 WITH VITAMIN D3 ORAL)]        cetirizine (ZYRTEC) 10 MG tablet Take 10 mg by mouth as needed       IBUPROFEN PO Take by mouth as needed for moderate pain       MULTIVITAMIN (MULTIPLE VITAMIN ORAL) [MULTIVITAMIN (MULTIPLE VITAMIN ORAL)] Take 1 tablet by mouth daily.       Probiotic Product (PROBIOTIC PO) Take by mouth daily       pseudoephedrine (SUDAFED) 30 MG tablet [PSEUDOEPHEDRINE (SUDAFED) 30 MG TABLET] Take 30 mg by mouth every 4 (four) hours as needed for congestion.         Allergies   Allergen Reactions     Sulfa (Sulfonamide Antibiotics) [Sulfa Drugs] Unknown      Social History     Tobacco Use     Smoking status: Current Every Day Smoker     Packs/day: 0.50     Types: Cigarettes, Cigarettes     Smokeless tobacco: Never Used   Substance Use Topics     Alcohol use: Yes     Alcohol/week: 5.8 standard drinks       History   Drug Use No         Objective     /76 (BP Location: Right arm, Patient Position: Sitting, Cuff Size: Adult Large)   Pulse 76   Ht 1.778 m (5' 10\")   Wt 89.6 kg (197 lb 8 oz)   SpO2 96%   BMI 28.34 kg/m      Physical Exam    General: Alert, in no distress  Skin: No significant lesion seen.  Eyes/nose/throat: Eyes without scleral icterus, eye movements normal, " pupils equal and reactive, oropharynx clear  MSK: Neck with good ROM  Lymphatic: Neck without adenopathy or masses  Pulm: Lungs clear to auscultation bilaterally  Cardiac: Heart with regular rate and rhythm, no murmur or gallop  GI: Abdomen soft, nontender  MSK: Extremities no tenderness or edema  Neuro: Moves all extremities, without focal weakness  Psych: Alert, normal mental status. Normal affect and speech    Diagnostics:    I spent 40 minutes on this encounter, including reviewing interval history since last visit, examining the patient, explaining and counseling the issues enumerated in the Assessment and Plan (patient given a copy), ordering indicated tests, ordering prescriptions, extensive counseling      Signed Electronically by: JONATHAN BERNAL MD  Copy of this evaluation report is provided to requesting physician.

## 2022-06-09 NOTE — Clinical Note
Preop note ready to send.  Patient is NOT cleared for surgery. Please send my note to  Dr. Sabas Kimball, [facility], fax [phone]

## 2022-06-10 ENCOUNTER — TELEPHONE (OUTPATIENT)
Dept: INTERNAL MEDICINE | Facility: CLINIC | Age: 67
End: 2022-06-10
Payer: COMMERCIAL

## 2022-06-14 ENCOUNTER — LAB (OUTPATIENT)
Dept: LAB | Facility: CLINIC | Age: 67
End: 2022-06-14
Payer: COMMERCIAL

## 2022-06-14 DIAGNOSIS — E78.5 HYPERLIPIDEMIA LDL GOAL <100: ICD-10-CM

## 2022-06-14 DIAGNOSIS — Z13.1 SCREENING FOR DIABETES MELLITUS: ICD-10-CM

## 2022-06-14 DIAGNOSIS — Z01.818 PREOPERATIVE EXAMINATION: ICD-10-CM

## 2022-06-14 LAB
ALBUMIN SERPL-MCNC: 3.7 G/DL (ref 3.5–5)
ALP SERPL-CCNC: 90 U/L (ref 45–120)
ALT SERPL W P-5'-P-CCNC: 21 U/L (ref 0–45)
ANION GAP SERPL CALCULATED.3IONS-SCNC: 8 MMOL/L (ref 5–18)
AST SERPL W P-5'-P-CCNC: 18 U/L (ref 0–40)
BILIRUB SERPL-MCNC: 0.6 MG/DL (ref 0–1)
BUN SERPL-MCNC: 12 MG/DL (ref 8–22)
CALCIUM SERPL-MCNC: 9.2 MG/DL (ref 8.5–10.5)
CHLORIDE BLD-SCNC: 106 MMOL/L (ref 98–107)
CHOLEST SERPL-MCNC: 181 MG/DL
CO2 SERPL-SCNC: 28 MMOL/L (ref 22–31)
CREAT SERPL-MCNC: 0.79 MG/DL (ref 0.6–1.1)
ERYTHROCYTE [DISTWIDTH] IN BLOOD BY AUTOMATED COUNT: 12.9 % (ref 10–15)
FASTING STATUS PATIENT QL REPORTED: YES
GFR SERPL CREATININE-BSD FRML MDRD: 81 ML/MIN/1.73M2
GLUCOSE BLD-MCNC: 93 MG/DL (ref 70–125)
HBA1C MFR BLD: 5.3 % (ref 0–5.6)
HCT VFR BLD AUTO: 43.1 % (ref 35–47)
HDLC SERPL-MCNC: 58 MG/DL
HGB BLD-MCNC: 14.5 G/DL (ref 11.7–15.7)
LDLC SERPL CALC-MCNC: 102 MG/DL
MCH RBC QN AUTO: 29.9 PG (ref 26.5–33)
MCHC RBC AUTO-ENTMCNC: 33.6 G/DL (ref 31.5–36.5)
MCV RBC AUTO: 89 FL (ref 78–100)
PLATELET # BLD AUTO: 293 10E3/UL (ref 150–450)
POTASSIUM BLD-SCNC: 4.4 MMOL/L (ref 3.5–5)
PROT SERPL-MCNC: 6.1 G/DL (ref 6–8)
RBC # BLD AUTO: 4.85 10E6/UL (ref 3.8–5.2)
SODIUM SERPL-SCNC: 142 MMOL/L (ref 136–145)
TRIGL SERPL-MCNC: 103 MG/DL
TSH SERPL DL<=0.005 MIU/L-ACNC: 3.03 UIU/ML (ref 0.3–5)
WBC # BLD AUTO: 6.6 10E3/UL (ref 4–11)

## 2022-06-14 PROCEDURE — 36415 COLL VENOUS BLD VENIPUNCTURE: CPT

## 2022-06-14 PROCEDURE — 83036 HEMOGLOBIN GLYCOSYLATED A1C: CPT

## 2022-06-14 PROCEDURE — 85027 COMPLETE CBC AUTOMATED: CPT

## 2022-06-14 PROCEDURE — 80061 LIPID PANEL: CPT

## 2022-06-14 PROCEDURE — 84443 ASSAY THYROID STIM HORMONE: CPT

## 2022-06-14 PROCEDURE — 80053 COMPREHEN METABOLIC PANEL: CPT

## 2022-07-01 ENCOUNTER — HOSPITAL ENCOUNTER (OUTPATIENT)
Dept: NUCLEAR MEDICINE | Facility: CLINIC | Age: 67
Discharge: HOME | End: 2022-07-01
Attending: INTERNAL MEDICINE
Payer: COMMERCIAL

## 2022-07-01 ENCOUNTER — HOSPITAL ENCOUNTER (OUTPATIENT)
Dept: CARDIOLOGY | Facility: CLINIC | Age: 67
Discharge: HOME | End: 2022-07-01
Attending: INTERNAL MEDICINE
Payer: COMMERCIAL

## 2022-07-01 DIAGNOSIS — E78.5 HYPERLIPIDEMIA LDL GOAL <100: ICD-10-CM

## 2022-07-01 DIAGNOSIS — Z72.0 TOBACCO ABUSE: ICD-10-CM

## 2022-07-01 LAB
CV STRESS CURRENT BP HE: NORMAL
CV STRESS CURRENT HR HE: 101
CV STRESS CURRENT HR HE: 104
CV STRESS CURRENT HR HE: 106
CV STRESS CURRENT HR HE: 116
CV STRESS CURRENT HR HE: 123
CV STRESS CURRENT HR HE: 127
CV STRESS CURRENT HR HE: 130
CV STRESS CURRENT HR HE: 136
CV STRESS CURRENT HR HE: 136
CV STRESS CURRENT HR HE: 137
CV STRESS CURRENT HR HE: 69
CV STRESS CURRENT HR HE: 70
CV STRESS CURRENT HR HE: 79
CV STRESS CURRENT HR HE: 81
CV STRESS CURRENT HR HE: 82
CV STRESS CURRENT HR HE: 83
CV STRESS CURRENT HR HE: 84
CV STRESS CURRENT HR HE: 84
CV STRESS CURRENT HR HE: 91
CV STRESS CURRENT HR HE: 94
CV STRESS CURRENT HR HE: 96
CV STRESS CURRENT HR HE: 96
CV STRESS CURRENT HR HE: 97
CV STRESS DEVIATION TIME HE: NORMAL
CV STRESS ECHO PERCENT HR HE: NORMAL
CV STRESS EXERCISE STAGE HE: NORMAL
CV STRESS EXERCISE STAGE REACHED HE: NORMAL
CV STRESS FINAL RESTING BP HE: NORMAL
CV STRESS FINAL RESTING HR HE: 82
CV STRESS MAX HR HE: 137
CV STRESS MAX TREADMILL GRADE HE: 12
CV STRESS MAX TREADMILL SPEED HE: 2.5
CV STRESS PEAK DIA BP HE: NORMAL
CV STRESS PEAK SYS BP HE: NORMAL
CV STRESS PHASE HE: NORMAL
CV STRESS PROTOCOL HE: NORMAL
CV STRESS RESTING PT POSITION HE: NORMAL
CV STRESS RESTING PT POSITION HE: NORMAL
CV STRESS ST DEVIATION AMOUNT HE: NORMAL
CV STRESS ST DEVIATION ELEVATION HE: NORMAL
CV STRESS ST EVELATION AMOUNT HE: NORMAL
CV STRESS TEST TYPE HE: NORMAL
CV STRESS TOTAL STAGE TIME MIN 1 HE: NORMAL
RATE PRESSURE PRODUCT: NORMAL
STRESS ECHO BASELINE DIASTOLIC HE: 74
STRESS ECHO BASELINE HR: 71
STRESS ECHO BASELINE SYSTOLIC BP: 132
STRESS ECHO CALCULATED PERCENT HR: 91 %
STRESS ECHO LAST STRESS DIASTOLIC BP: 80
STRESS ECHO LAST STRESS HR: 137
STRESS ECHO LAST STRESS SYSTOLIC BP: 140
STRESS ECHO POST ESTIMATED WORKLOAD: 7
STRESS ECHO POST EXERCISE DUR MIN: 5
STRESS ECHO POST EXERCISE DUR SEC: 5
STRESS ECHO TARGET HR: 151
STRESS ST DEPRESSION: 1 MM

## 2022-07-01 PROCEDURE — 343N000001 HC RX 343: Performed by: INTERNAL MEDICINE

## 2022-07-01 PROCEDURE — 93017 CV STRESS TEST TRACING ONLY: CPT

## 2022-07-01 PROCEDURE — 78452 HT MUSCLE IMAGE SPECT MULT: CPT | Mod: 26 | Performed by: INTERNAL MEDICINE

## 2022-07-01 PROCEDURE — 93016 CV STRESS TEST SUPVJ ONLY: CPT | Performed by: INTERNAL MEDICINE

## 2022-07-01 PROCEDURE — A9500 TC99M SESTAMIBI: HCPCS | Performed by: INTERNAL MEDICINE

## 2022-07-01 PROCEDURE — 93017 CV STRESS TEST TRACING ONLY: CPT | Performed by: INTERNAL MEDICINE

## 2022-07-01 PROCEDURE — 78452 HT MUSCLE IMAGE SPECT MULT: CPT

## 2022-07-01 PROCEDURE — 93018 CV STRESS TEST I&R ONLY: CPT | Performed by: INTERNAL MEDICINE

## 2022-07-01 RX ADMIN — Medication 8.3 MCI.: at 08:00

## 2022-07-01 RX ADMIN — Medication 30.2 MILLICURIE: at 09:15

## 2022-07-05 DIAGNOSIS — E78.5 HYPERLIPIDEMIA LDL GOAL <100: ICD-10-CM

## 2022-07-05 RX ORDER — ATORVASTATIN CALCIUM 20 MG/1
TABLET, FILM COATED ORAL
Qty: 90 TABLET | Refills: 3 | Status: SHIPPED | OUTPATIENT
Start: 2022-07-05 | End: 2023-09-08

## 2022-07-06 NOTE — TELEPHONE ENCOUNTER
"Last Written Prescription Date:  1/5/22  Last Fill Quantity: 90,  # refills: 1   Last office visit provider:  6/9/22     Requested Prescriptions   Pending Prescriptions Disp Refills     atorvastatin (LIPITOR) 20 MG tablet [Pharmacy Med Name: Atorvastatin Calcium Oral Tablet 20 MG] 90 tablet 0     Sig: TAKE ONE TABLET BY MOUTH ONE TIME DAILY       Statins Protocol Passed - 7/5/2022  9:39 AM        Passed - LDL on file in past 12 months     Recent Labs   Lab Test 06/14/22  0834                Passed - No abnormal creatine kinase in past 12 months     No lab results found.             Passed - Recent (12 mo) or future (30 days) visit within the authorizing provider's specialty     Patient has had an office visit with the authorizing provider or a provider within the authorizing providers department within the previous 12 mos or has a future within next 30 days. See \"Patient Info\" tab in inbasket, or \"Choose Columns\" in Meds & Orders section of the refill encounter.              Passed - Medication is active on med list        Passed - Patient is age 18 or older        Passed - No active pregnancy on record        Passed - No positive pregnancy test in past 12 months             Sabine Anand, RN 07/05/22 7:09 PM  "

## 2022-07-13 ENCOUNTER — OFFICE VISIT (OUTPATIENT)
Dept: INTERNAL MEDICINE | Facility: CLINIC | Age: 67
End: 2022-07-13
Payer: COMMERCIAL

## 2022-07-13 VITALS
OXYGEN SATURATION: 97 % | BODY MASS INDEX: 28.12 KG/M2 | WEIGHT: 196 LBS | HEART RATE: 71 BPM | DIASTOLIC BLOOD PRESSURE: 70 MMHG | SYSTOLIC BLOOD PRESSURE: 118 MMHG

## 2022-07-13 DIAGNOSIS — M41.35 THORACOGENIC SCOLIOSIS OF THORACOLUMBAR REGION: ICD-10-CM

## 2022-07-13 DIAGNOSIS — M51.379 DEGENERATION OF LUMBAR OR LUMBOSACRAL INTERVERTEBRAL DISC: ICD-10-CM

## 2022-07-13 DIAGNOSIS — Z72.0 TOBACCO ABUSE: ICD-10-CM

## 2022-07-13 DIAGNOSIS — M54.16 LUMBAR RADICULOPATHY: Primary | ICD-10-CM

## 2022-07-13 PROCEDURE — 99214 OFFICE O/P EST MOD 30 MIN: CPT | Performed by: INTERNAL MEDICINE

## 2022-07-13 NOTE — PROGRESS NOTES
Office Visit - Follow Up   Anat Phillips   67 year old female    Date of Visit: 7/13/2022    Chief Complaint   Patient presents with     Follow Up     1 mo follow up        -------------------------------------------------------------------------------------------------------------------------  Assessment and Plan    Follow-up for chronic low back pain which I believe is on the basis of lumbar spondylosis, and I believe she could have some component of chronic lumbar regularity, may be lumbar spinal stenosis as well    Anat plans to hold off on surgery, continue with a conservative management strategy of maintaining mobility and doing her back strengthening exercises.    I had seen Anat for preop exam on June 9, 2022, but I was concerned about her overall health and went to assess her cardiac status before she underwent proposed three-level lumbar fusion surgery.    It turns out she did just fine on a cardiac stress test (details below)    Anat told me that she really was to hold off on surgery, but that she has a lot going on in her life, taking care of her mother-in-law and also a sibling.    I again reminded Anat of the importance of stopping smoking.  She did try to fill the prescription for Chantix but it was prohibitively expensive.    She still has her exercise sheets from previous physical therapy, and she is can adjust those off and do her exercises every day.  She still does her household chores, including mowing the lawn with a push mower.  And trimming bushes.    She will let me know if she wants to get plugged in with a different spine consultation center, such as [facility] or [place] [facility].  She has had good experience with [place] historically.    Lumbar spondylosis symptomatic lumbar spinal stenosis with radiculopathy symptoms  Chronic low back pain in the context of lumbar disc degeneration, thoracolumbar scoliosis, and chronic right-sided lumbar radiculopathy with history of  right L5 microdiscectomy performed in 2015  She has had weakness of her right foot dorsiflexors, which fits with her description of a mild foot drop.    MRI studies have shown extensive degenerative disc disease and facet arthropathy    Borderline electrocardiogram 6-9-2022, satisfactory nuclear cardiac stress test 2/9/2022  Sinus rhythm   Non-specific intra-ventricular conduction delay   Borderline ECG     6-9-2022 Nuclear cardiac stress test     The patient's exercise capacity is average, achieving 5:05 on a Ciro Protocol. Exercise was terminated due to fatigue.     Ischemic ECG changes were noted at peak exercise. Additionally, frequent PVCs noted during recovery that settled down 5 minutes into recovery.     Nuclear stress imaging is negative for inducible myocardial ischemia or infarction.     The left ventricular ejection fraction at stress is greater than 70%.     The patient is at a low risk of future cardiac ischemic events.     There is no prior study for comparison.       Probable COPD, chronic bronchitis type, chronic cough, sputum production in the context of long history of cigarette smoking of at least 35 pack years that is still ongoing about 1 pack/day as of June 2022  10- screening lung CT  IMPRESSION:  CONCLUSION:  1.  Scattered pulmonary nodules measure up to 4 mm. Follow-up recommendations are below.  2.  There is bronchial wall thickening which can be seen with edema or bronchitis.  3.   There is scarring in the upper lung zones. This is nonspecific though may be related to prior infection.     Tobacco use, about 1 ppd  I did prescribe Chantix June 2022 , but it was extremely expensive.   Alternatives are nicotine replacement with patches and/or gum.    Another alternative is bupropion (also known as Wellbutrin or Zyban)     Mild aortic insufficiency seen on echocardiogram January 2016.  I did not hear an AI murmur today.  At some point we might get another echocardiogram to  recheck.     Satisfactory blood pressure noted today.     Hyperlipidemia with elevated LDL cholesterol  10- pretreatment  LDL Cholesterol Calculated <=129 mg/dL 175 High    In response to that, started on atorvastatin 20 mg a day that I prescribed, and she is taking consistently     Post menopause, due for screening mammography  Menses stopped in her early 50s.  Pap smears were normal in 2013 in 2016, she recalls never having had abnormal Pap smear.  I believe she can stop Pap screening.  No problems with vaginal bleeding or spotting.  We have also ordered a bone density scan.     History of serrated adenoma colon polyp seen in 2016, next colonoscopy 5 years after that which would be 2021-- overdue     Osteopenia  11-2-2019  1. The spine bone density L1-L4 with T-score -1.4 and significant decline of 14.6 % compared to 2004.  2. Femoral bone densities show left total hip T- score -1.4 and right total hip T-score -1.2 and significant decline of 17% on the left hip and 13.8% on the right hip compared to 2004.  3. Trabecular bone score indicates poor trabecular bone architecture.  64 y.o. female with LOW BONE DENSITY (OSTEOPENIA) and MODERATE fracture risk, adjusted for the TBS, with major osteoporotic fracture risk 10.8% and hip fracture risk 1.8%.      Benign seborrheic keratosis on her back.      Zoster vaccine indicated, will give her Shingrix shot #1 (of 2) today.     Overweight and deconditioned  Wt Readings from Last 5 Encounters:   07/13/22 88.9 kg (196 lb)   06/09/22 89.6 kg (197 lb 8 oz)   06/16/21 86.2 kg (190 lb)   10/24/19 80.7 kg (178 lb)   01/21/16 78.1 kg (172 lb 1.6 oz)       COVID-19,PF,Pfizer (12+ Yrs) 03/06/2021, 03/27/2021, 12/20/2021   I suggested that she would into the autumn 2022 to get one of the updated COVID-19 vaccines that is formulated against the  variants        --------------------------------------------------------------------------------------------------------------------------  History of Present Illness  This 67 year old old     Back Pain:  She presents for follow up of back pain. Patient's back pain is a chronic problem.  Location of back pain:  Right lower back, left lower back, right hip and left hip  Description of back pain: gnawing and sharp  Back pain spreads: right buttocks, left buttocks and left knee        Wt Readings from Last 3 Encounters:   07/13/22 88.9 kg (196 lb)   06/09/22 89.6 kg (197 lb 8 oz)   06/16/21 86.2 kg (190 lb)     BP Readings from Last 3 Encounters:   07/13/22 118/70   06/09/22 124/76   07/31/20 (!) 154/87         ---------------------------------------------------------------------------------------------------------------------------    Medications, Allergies, Social, and Problem List   Current Outpatient Medications   Medication Sig Dispense Refill     Aspirin 81 MG CAPS [ASPIRIN 81 MG CHEWABLE TABLET] Chew 81 mg daily.       atorvastatin (LIPITOR) 20 MG tablet TAKE ONE TABLET BY MOUTH ONE TIME DAILY 90 tablet 3     calcium carbonate/vitamin D3 (CALCIUM 600 WITH VITAMIN D3 ORAL) [CALCIUM CARBONATE/VITAMIN D3 (CALCIUM 600 WITH VITAMIN D3 ORAL)]        cetirizine (ZYRTEC) 10 MG tablet Take 10 mg by mouth as needed       IBUPROFEN PO Take by mouth as needed for moderate pain       MULTIVITAMIN (MULTIPLE VITAMIN ORAL) [MULTIVITAMIN (MULTIPLE VITAMIN ORAL)] Take 1 tablet by mouth daily.       Probiotic Product (PROBIOTIC PO) Take by mouth daily       pseudoephedrine (SUDAFED) 30 MG tablet [PSEUDOEPHEDRINE (SUDAFED) 30 MG TABLET] Take 30 mg by mouth every 4 (four) hours as needed for congestion.       varenicline (CHANTIX STEVEN) 0.5 MG X 11 & 1 MG X 42 tablet Take 0.5 mg tab daily for 3 days, THEN 0.5 mg tab twice daily for 4 days, THEN 1 mg twice daily. 53 tablet 0     Allergies   Allergen Reactions     Sulfa (Sulfonamide  Antibiotics) [Sulfa Drugs] Unknown     Social History     Tobacco Use     Smoking status: Current Every Day Smoker     Packs/day: 0.50     Types: Cigarettes, Cigarettes     Smokeless tobacco: Never Used   Substance Use Topics     Alcohol use: Yes     Alcohol/week: 5.8 standard drinks     Drug use: No     Patient Active Problem List   Diagnosis     Lumbar Disc Degeneration     Thoracogenic scoliosis of thoracolumbar region     Hyperlipidemia LDL goal <100     Lumbar radiculopathy     Tobacco abuse     Chronic cough        Reviewed, reconciled and updated       Physical Exam   General Appearance:       /70   Pulse 71   Wt 88.9 kg (196 lb)   SpO2 97%   BMI 28.12 kg/m      Anat looks good today, mobility seems unimpaired     Additional Information   I spent 30 minutes on this encounter, including reviewing interval history since last visit, examining the patient, explaining and counseling the issues enumerated in the Assessment and Plan (patient given a copy)     JONATHAN BERNAL MD, MD

## 2022-07-13 NOTE — PATIENT INSTRUCTIONS
Follow-up for chronic low back pain which I believe is on the basis of lumbar spondylosis, and I believe she could have some component of chronic lumbar regularity, may be lumbar spinal stenosis as well    Anat plans to hold off on surgery, continue with a conservative management strategy of maintaining mobility and doing her back strengthening exercises.    I had seen Anat for preop exam on June 9, 2022, but I was concerned about her overall health and went to assess her cardiac status before she underwent proposed three-level lumbar fusion surgery.    It turns out she did just fine on a cardiac stress test (details below)    Anat told me that she really was to hold off on surgery, but that she has a lot going on in her life, taking care of her mother-in-law and also a sibling.    I again reminded Anat of the importance of stopping smoking.  She did try to fill the prescription for Chantix but it was prohibitively expensive.    She still has her exercise sheets from previous physical therapy, and she is can adjust those off and do her exercises every day.  She still does her household chores, including mowing the lawn with a push mower.  And trimming bushes.    She will let me know if she wants to get plugged in with a different spine consultation center, such as [facility] or [place] [facility].  She has had good experience with [place] historically.    Lumbar spondylosis symptomatic lumbar spinal stenosis with radiculopathy symptoms  Chronic low back pain in the context of lumbar disc degeneration, thoracolumbar scoliosis, and chronic right-sided lumbar radiculopathy with history of right L5 microdiscectomy performed in 2015  She has had weakness of her right foot dorsiflexors, which fits with her description of a mild foot drop.    MRI studies have shown extensive degenerative disc disease and facet arthropathy    Borderline electrocardiogram 6-9-2022, satisfactory nuclear cardiac stress test  2/9/2022  Sinus rhythm   Non-specific intra-ventricular conduction delay   Borderline ECG     6-9-2022 Nuclear cardiac stress test    The patient's exercise capacity is average, achieving 5:05 on a Ciro Protocol. Exercise was terminated due to fatigue.    Ischemic ECG changes were noted at peak exercise. Additionally, frequent PVCs noted during recovery that settled down 5 minutes into recovery.    Nuclear stress imaging is negative for inducible myocardial ischemia or infarction.    The left ventricular ejection fraction at stress is greater than 70%.    The patient is at a low risk of future cardiac ischemic events.    There is no prior study for comparison.       Probable COPD, chronic bronchitis type, chronic cough, sputum production in the context of long history of cigarette smoking of at least 35 pack years that is still ongoing about 1 pack/day as of June 2022  10- screening lung CT  IMPRESSION:  CONCLUSION:  1.  Scattered pulmonary nodules measure up to 4 mm. Follow-up recommendations are below.  2.  There is bronchial wall thickening which can be seen with edema or bronchitis.  3.   There is scarring in the upper lung zones. This is nonspecific though may be related to prior infection.     Tobacco use, about 1 ppd  I did prescribe Chantix June 2022 , but it was extremely expensive.   Alternatives are nicotine replacement with patches and/or gum.    Another alternative is bupropion (also known as Wellbutrin or Zyban)     Mild aortic insufficiency seen on echocardiogram January 2016.  I did not hear an AI murmur today.  At some point we might get another echocardiogram to recheck.     Satisfactory blood pressure noted today.     Hyperlipidemia with elevated LDL cholesterol  10- pretreatment  LDL Cholesterol Calculated <=129 mg/dL 175 High    In response to that, started on atorvastatin 20 mg a day that I prescribed, and she is taking consistently     Post menopause, due for screening  mammography  Menses stopped in her early 50s.  Pap smears were normal in 2013 in 2016, she recalls never having had abnormal Pap smear.  I believe she can stop Pap screening.  No problems with vaginal bleeding or spotting.  We have also ordered a bone density scan.     History of serrated adenoma colon polyp seen in 2016, next colonoscopy 5 years after that which would be 2021-- overdue     Osteopenia  11-2-2019  1. The spine bone density L1-L4 with T-score -1.4 and significant decline of 14.6 % compared to 2004.  2. Femoral bone densities show left total hip T- score -1.4 and right total hip T-score -1.2 and significant decline of 17% on the left hip and 13.8% on the right hip compared to 2004.  3. Trabecular bone score indicates poor trabecular bone architecture.  64 y.o. female with LOW BONE DENSITY (OSTEOPENIA) and MODERATE fracture risk, adjusted for the TBS, with major osteoporotic fracture risk 10.8% and hip fracture risk 1.8%.      Benign seborrheic keratosis on her back.      Zoster vaccine indicated, will give her Shingrix shot #1 (of 2) today.     Overweight and deconditioned  Wt Readings from Last 5 Encounters:   07/13/22 88.9 kg (196 lb)   06/09/22 89.6 kg (197 lb 8 oz)   06/16/21 86.2 kg (190 lb)   10/24/19 80.7 kg (178 lb)   01/21/16 78.1 kg (172 lb 1.6 oz)       COVID-19,JAYA,Pfizer (12+ Yrs) 03/06/2021, 03/27/2021, 12/20/2021   I suggested that she would into the nikko 2022 to get one of the updated COVID-19 vaccines that is formulated against the variants

## 2022-07-16 ENCOUNTER — HOSPITAL ENCOUNTER (EMERGENCY)
Facility: CLINIC | Age: 67
Discharge: HOME | End: 2022-07-16
Attending: EMERGENCY MEDICINE | Admitting: OBSTETRICS & GYNECOLOGY
Payer: COMMERCIAL

## 2022-07-16 ENCOUNTER — ANESTHESIA (OUTPATIENT)
Dept: SURGERY | Facility: CLINIC | Age: 67
End: 2022-07-16
Payer: COMMERCIAL

## 2022-07-16 ENCOUNTER — ANESTHESIA EVENT (OUTPATIENT)
Dept: SURGERY | Facility: CLINIC | Age: 67
End: 2022-07-16
Payer: COMMERCIAL

## 2022-07-16 ENCOUNTER — APPOINTMENT (OUTPATIENT)
Dept: ULTRASOUND IMAGING | Facility: CLINIC | Age: 67
End: 2022-07-16
Attending: EMERGENCY MEDICINE
Payer: COMMERCIAL

## 2022-07-16 ENCOUNTER — APPOINTMENT (OUTPATIENT)
Dept: CT IMAGING | Facility: CLINIC | Age: 67
End: 2022-07-16
Attending: EMERGENCY MEDICINE
Payer: COMMERCIAL

## 2022-07-16 VITALS
RESPIRATION RATE: 16 BRPM | BODY MASS INDEX: 27.26 KG/M2 | DIASTOLIC BLOOD PRESSURE: 63 MMHG | WEIGHT: 190 LBS | SYSTOLIC BLOOD PRESSURE: 128 MMHG | OXYGEN SATURATION: 98 % | TEMPERATURE: 98.8 F | HEART RATE: 64 BPM

## 2022-07-16 DIAGNOSIS — R10.32 ABDOMINAL PAIN, LEFT LOWER QUADRANT: ICD-10-CM

## 2022-07-16 DIAGNOSIS — N83.8 OVARIAN MASS: ICD-10-CM

## 2022-07-16 LAB
ALBUMIN SERPL-MCNC: 4 G/DL (ref 3.5–5)
ALBUMIN UR-MCNC: NEGATIVE MG/DL
ALP SERPL-CCNC: 108 U/L (ref 45–120)
ALT SERPL W P-5'-P-CCNC: 29 U/L (ref 0–45)
ANION GAP SERPL CALCULATED.3IONS-SCNC: 9 MMOL/L (ref 5–18)
APPEARANCE UR: CLEAR
AST SERPL W P-5'-P-CCNC: 18 U/L (ref 0–40)
BASOPHILS # BLD AUTO: 0.1 10E3/UL (ref 0–0.2)
BASOPHILS NFR BLD AUTO: 1 %
BILIRUB SERPL-MCNC: 0.5 MG/DL (ref 0–1)
BILIRUB UR QL STRIP: NEGATIVE
BUN SERPL-MCNC: 14 MG/DL (ref 8–22)
CALCIUM SERPL-MCNC: 9.7 MG/DL (ref 8.5–10.5)
CANCER AG125 SERPL-ACNC: 37 U/ML
CHLORIDE BLD-SCNC: 107 MMOL/L (ref 98–107)
CO2 SERPL-SCNC: 25 MMOL/L (ref 22–31)
COLOR UR AUTO: COLORLESS
CREAT SERPL-MCNC: 0.8 MG/DL (ref 0.6–1.1)
EOSINOPHIL # BLD AUTO: 0.1 10E3/UL (ref 0–0.7)
EOSINOPHIL NFR BLD AUTO: 1 %
ERYTHROCYTE [DISTWIDTH] IN BLOOD BY AUTOMATED COUNT: 13.1 % (ref 10–15)
GFR SERPL CREATININE-BSD FRML MDRD: 79 ML/MIN/1.73M2
GLUCOSE BLD-MCNC: 129 MG/DL (ref 70–125)
GLUCOSE UR STRIP-MCNC: NEGATIVE MG/DL
HCT VFR BLD AUTO: 42.3 % (ref 35–47)
HGB BLD-MCNC: 14.6 G/DL (ref 11.7–15.7)
HGB UR QL STRIP: NEGATIVE
HOLD SPECIMEN: NORMAL
HOLD SPECIMEN: NORMAL
IMM GRANULOCYTES # BLD: 0.1 10E3/UL
IMM GRANULOCYTES NFR BLD: 1 %
KETONES UR STRIP-MCNC: NEGATIVE MG/DL
LEUKOCYTE ESTERASE UR QL STRIP: NEGATIVE
LIPASE SERPL-CCNC: 28 U/L (ref 0–52)
LYMPHOCYTES # BLD AUTO: 0.7 10E3/UL (ref 0.8–5.3)
LYMPHOCYTES NFR BLD AUTO: 7 %
MCH RBC QN AUTO: 29.6 PG (ref 26.5–33)
MCHC RBC AUTO-ENTMCNC: 34.5 G/DL (ref 31.5–36.5)
MCV RBC AUTO: 86 FL (ref 78–100)
MONOCYTES # BLD AUTO: 0.6 10E3/UL (ref 0–1.3)
MONOCYTES NFR BLD AUTO: 5 %
MUCOUS THREADS #/AREA URNS LPF: PRESENT /LPF
NEUTROPHILS # BLD AUTO: 9.2 10E3/UL (ref 1.6–8.3)
NEUTROPHILS NFR BLD AUTO: 85 %
NITRATE UR QL: NEGATIVE
NRBC # BLD AUTO: 0 10E3/UL
NRBC BLD AUTO-RTO: 0 /100
PH UR STRIP: 6.5 [PH] (ref 5–7)
PLATELET # BLD AUTO: 287 10E3/UL (ref 150–450)
POTASSIUM BLD-SCNC: 3.6 MMOL/L (ref 3.5–5)
PROT SERPL-MCNC: 7.1 G/DL (ref 6–8)
RBC # BLD AUTO: 4.93 10E6/UL (ref 3.8–5.2)
RBC URINE: 1 /HPF
SARS-COV-2 RNA RESP QL NAA+PROBE: NEGATIVE
SODIUM SERPL-SCNC: 141 MMOL/L (ref 136–145)
SP GR UR STRIP: 1.05 (ref 1–1.03)
UROBILINOGEN UR STRIP-MCNC: <2 MG/DL
WBC # BLD AUTO: 10.8 10E3/UL (ref 4–11)
WBC URINE: 0 /HPF

## 2022-07-16 PROCEDURE — 81001 URINALYSIS AUTO W/SCOPE: CPT | Performed by: EMERGENCY MEDICINE

## 2022-07-16 PROCEDURE — 36415 COLL VENOUS BLD VENIPUNCTURE: CPT | Performed by: EMERGENCY MEDICINE

## 2022-07-16 PROCEDURE — 86304 IMMUNOASSAY TUMOR CA 125: CPT | Performed by: OBSTETRICS & GYNECOLOGY

## 2022-07-16 PROCEDURE — 96375 TX/PRO/DX INJ NEW DRUG ADDON: CPT

## 2022-07-16 PROCEDURE — 250N000011 HC RX IP 250 OP 636: Performed by: EMERGENCY MEDICINE

## 2022-07-16 PROCEDURE — U0005 INFEC AGEN DETEC AMPLI PROBE: HCPCS | Performed by: EMERGENCY MEDICINE

## 2022-07-16 PROCEDURE — 80053 COMPREHEN METABOLIC PANEL: CPT | Performed by: EMERGENCY MEDICINE

## 2022-07-16 PROCEDURE — 96376 TX/PRO/DX INJ SAME DRUG ADON: CPT

## 2022-07-16 PROCEDURE — C9803 HOPD COVID-19 SPEC COLLECT: HCPCS

## 2022-07-16 PROCEDURE — 76856 US EXAM PELVIC COMPLETE: CPT

## 2022-07-16 PROCEDURE — 96374 THER/PROPH/DIAG INJ IV PUSH: CPT | Mod: 59

## 2022-07-16 PROCEDURE — 120N000001 HC R&B MED SURG/OB

## 2022-07-16 PROCEDURE — 96361 HYDRATE IV INFUSION ADD-ON: CPT

## 2022-07-16 PROCEDURE — 82040 ASSAY OF SERUM ALBUMIN: CPT | Performed by: EMERGENCY MEDICINE

## 2022-07-16 PROCEDURE — 85025 COMPLETE CBC W/AUTO DIFF WBC: CPT | Performed by: EMERGENCY MEDICINE

## 2022-07-16 PROCEDURE — 83690 ASSAY OF LIPASE: CPT | Performed by: EMERGENCY MEDICINE

## 2022-07-16 PROCEDURE — 74177 CT ABD & PELVIS W/CONTRAST: CPT

## 2022-07-16 PROCEDURE — 99285 EMERGENCY DEPT VISIT HI MDM: CPT | Mod: 25

## 2022-07-16 PROCEDURE — 258N000003 HC RX IP 258 OP 636: Performed by: EMERGENCY MEDICINE

## 2022-07-16 RX ORDER — CEFAZOLIN SODIUM/WATER 2 G/20 ML
SYRINGE (ML) INTRAVENOUS
Status: DISCONTINUED
Start: 2022-07-16 | End: 2022-07-16 | Stop reason: HOSPADM

## 2022-07-16 RX ORDER — ONDANSETRON 2 MG/ML
4 INJECTION INTRAMUSCULAR; INTRAVENOUS ONCE
Status: COMPLETED | OUTPATIENT
Start: 2022-07-16 | End: 2022-07-16

## 2022-07-16 RX ORDER — HYDROMORPHONE HYDROCHLORIDE 1 MG/ML
0.5 INJECTION, SOLUTION INTRAMUSCULAR; INTRAVENOUS; SUBCUTANEOUS ONCE
Status: COMPLETED | OUTPATIENT
Start: 2022-07-16 | End: 2022-07-16

## 2022-07-16 RX ORDER — KETOROLAC TROMETHAMINE 15 MG/ML
15 INJECTION, SOLUTION INTRAMUSCULAR; INTRAVENOUS ONCE
Status: COMPLETED | OUTPATIENT
Start: 2022-07-16 | End: 2022-07-16

## 2022-07-16 RX ORDER — LIDOCAINE 40 MG/G
CREAM TOPICAL
Status: CANCELLED | OUTPATIENT
Start: 2022-07-16

## 2022-07-16 RX ORDER — SODIUM CHLORIDE, SODIUM LACTATE, POTASSIUM CHLORIDE, CALCIUM CHLORIDE 600; 310; 30; 20 MG/100ML; MG/100ML; MG/100ML; MG/100ML
INJECTION, SOLUTION INTRAVENOUS CONTINUOUS
Status: CANCELLED | OUTPATIENT
Start: 2022-07-16

## 2022-07-16 RX ORDER — IOPAMIDOL 755 MG/ML
100 INJECTION, SOLUTION INTRAVASCULAR ONCE
Status: COMPLETED | OUTPATIENT
Start: 2022-07-16 | End: 2022-07-16

## 2022-07-16 RX ADMIN — ONDANSETRON 4 MG: 2 INJECTION INTRAMUSCULAR; INTRAVENOUS at 07:47

## 2022-07-16 RX ADMIN — HYDROMORPHONE HYDROCHLORIDE 1 MG: 1 INJECTION, SOLUTION INTRAMUSCULAR; INTRAVENOUS; SUBCUTANEOUS at 09:19

## 2022-07-16 RX ADMIN — KETOROLAC TROMETHAMINE 15 MG: 15 INJECTION, SOLUTION INTRAMUSCULAR; INTRAVENOUS at 07:47

## 2022-07-16 RX ADMIN — HYDROMORPHONE HYDROCHLORIDE 0.5 MG: 1 INJECTION, SOLUTION INTRAMUSCULAR; INTRAVENOUS; SUBCUTANEOUS at 08:27

## 2022-07-16 RX ADMIN — SODIUM CHLORIDE 1000 ML: 9 INJECTION, SOLUTION INTRAVENOUS at 07:47

## 2022-07-16 RX ADMIN — IOPAMIDOL 100 ML: 755 INJECTION, SOLUTION INTRAVENOUS at 08:40

## 2022-07-16 ASSESSMENT — ACTIVITIES OF DAILY LIVING (ADL): ADLS_ACUITY_SCORE: 35

## 2022-07-16 NOTE — ED TRIAGE NOTES
Patient is here with abdomen pain with emesis that started last night. No hx of GI issues. Last BM was today.

## 2022-07-16 NOTE — ED PROVIDER NOTES
EMERGENCY DEPARTMENT ENCOUNTER      NAME: Anat Phillips  AGE: 67 year old female  YOB: 1955  MRN: [id number]  EVALUATION DATE & TIME: 7/16/2022  7:10 AM    PCP: Isiah Gallo    ED PROVIDER: Piper Mendez M.D.      Chief Complaint   Patient presents with     Abdominal Pain         FINAL IMPRESSION:  1. Ovarian mass    2. Abdominal pain, left lower quadrant          ED COURSE & MEDICAL DECISION MAKING:    ED Course as of 07/16/22 1141   Sat Jul 16, 2022   0729 Patient with atypical LLQ pain with nausea/vommiting. VS reassuringly WNL, no urinary symptoms, pending CMP/lipase/CBC/UA, cT abdomen and will reassess, given IVF/zofran/ketorolac, patient and wife Pastora at bedside amenable to plan.   0910 I spoke with Dr Key from radiology who reports patient with 6cm x 6c x 4cm LLQ mass, left gonadic vessels engorged, concern for potential ovarian torsion present, slight free fluid there, ob/gyn paged stat with pelvic US pending   0911 I spoke with US who is ready for US now   0916 Pt with continued pain, given further dilaudid, updated, no h/o ovarian pathology, to US now, tech taking pt to US   0954 I spoke with ob/gyn who is reviewing US imaging and requests COVID19 PCR   1008 Patient still in US at this time   1029 Radiology notes pelvic US 2008 with left ovary normal, US today with venous blood flow, solid left ovarian mass seen, he questions possible stromal tumor with intermittent torsion, most likely enlarged mass with intermittent torsion with free fluid to region with potential torsion related fluid vs. Ascites with ovarian possible tumor. US and CT radiology reviewed images together, not sure if MRI would further characterize, per them dispo per ob/gyn   1036 I spoke with Dr Santiago from ob/gyn who is aware of [place]'s read of US, she recommends oophorectomy/salpingectomy/washing here, she recommends to OR and will see patient here in ED now, admit to her on ob/gyn.   1137 I spoke with Dr Amador  who reassessed patient in person, notes with solid mass, patient now pain free and feels improved, after shared decsion making conversation with patient and her wife, they live close to hospital, plan to check  (already ordered) and follow up with ob/gyn Monday by phone, if  elevated then will refer for gyn onc for removal, if  normal then plan for Dr Amador' team to perform salpingoophorectomy and D&C, and if pain suddenly returns, patient to return to ED emergently, given restrictions. Patient discharged after being provided with extensive anticipatory guidance and given return precautions, importance of PMD follow-up emphasized.        Pertinent Labs & Imaging studies reviewed. (See chart for details)    N95 worn  A face shield was worn also  COVID PPE    7:21 AM I met with the patient and her wife, obtained history, performed an initial exam, and discussed options and plan for diagnostics and treatment here in the ED.   9:10 AM I spoke with Dr. Key, Radiology, about CT results. I paged for OB/GYN.  9:16 AM I updated the patient on preliminary Radiology read of CT results and plan for OB/GYN consult.   9:33 AM I spoke with Dr. Amador, OB/GYN, who will review imaging results.   10:25 AM I spoke with Dr. Burris, Radiology, about US results. Recommends OB/GYN for further imaging recommendations.   10:35 AM I spoke with Dr. Amador, OB/GYN, who recommends admit to her on ob/gyn, to OR today, keep NPO with last PO intake last night.    At the conclusion of the encounter I discussed the results of all of the tests and the disposition. The questions were answered. The patient or family acknowledged understanding and was agreeable with the care plan.     MEDICATIONS GIVEN IN THE EMERGENCY:  Medications   ceFAZolin Sodium (ANCEF) 2 G/20ML injection (has no administration in time range)   ketorolac (TORADOL) injection 15 mg (15 mg Intravenous Given 7/16/22 0747)   ondansetron (ZOFRAN) injection 4 mg (4 mg  Intravenous Given 7/16/22 0747)   0.9% sodium chloride BOLUS (0 mLs Intravenous Stopped 7/16/22 0827)   HYDROmorphone (PF) (DILAUDID) injection 0.5 mg (0.5 mg Intravenous Given 7/16/22 0827)   iopamidol (ISOVUE-370) solution 100 mL (100 mLs Intravenous Given 7/16/22 0840)   HYDROmorphone (DILAUDID) injection 1 mg (1 mg Intravenous Given 7/16/22 0919)       NEW PRESCRIPTIONS STARTED AT TODAY'S ER VISIT  New Prescriptions    No medications on file          =================================================================    HPI      Anat Phillips is a 67 year old female with PMHx of HLD and tobacco use disorder who presents to the ED today via private vehicle with wife for evaluation of abdominal pain and vomiting.    Patient reports persistent, progressively worsening constant lower abdominal pain, nausea, and vomiting since yesterday afternoon. She has been unable to keep down po intake since onset. She took TUMS yesterday without relief, though has not taken any medications since that time. Exacerbating factors include bearing down to have a bowel movement. She currently rates her abdominal pain 9/10. Otherwise denies diarrhea, constipation, dysuria, hematuria, fever, dyspnea, cough, or any other symptoms or concerns at this time. No history of abdominal surgeries.       REVIEW OF SYSTEMS   All other systems reviewed and are negative except as noted above in HPI.    PAST MEDICAL HISTORY:  History reviewed. No pertinent past medical history.    PAST SURGICAL HISTORY:  Past Surgical History:   Procedure Laterality Date     IR LUMBAR EPIDURAL STEROID INJECTION  10/12/2004     OTHER SURGICAL HISTORY  March 2015    L5 Radiculopathy       CURRENT MEDICATIONS:    Aspirin 81 MG CAPS  atorvastatin (LIPITOR) 20 MG tablet  calcium carbonate/vitamin D3 (CALCIUM 600 WITH VITAMIN D3 ORAL)  cetirizine (ZYRTEC) 10 MG tablet  IBUPROFEN PO  MULTIVITAMIN (MULTIPLE VITAMIN ORAL)  Probiotic Product (PROBIOTIC PO)  pseudoephedrine  (SUDAFED) 30 MG tablet        ALLERGIES:  Allergies   Allergen Reactions     Sulfa (Sulfonamide Antibiotics) [Sulfa Drugs] Unknown       FAMILY HISTORY:  Family History   Problem Relation Age of Onset     Heart Disease Father      Heart Disease Brother      Breast Cancer Niece 30.00         at 32       SOCIAL HISTORY:   Social History     Socioeconomic History     Marital status:    Tobacco Use     Smoking status: Current Every Day Smoker     Packs/day: 0.50     Types: Cigarettes, Cigarettes     Smokeless tobacco: Never Used   Substance and Sexual Activity     Alcohol use: Yes     Alcohol/week: 5.8 standard drinks     Drug use: No       VITALS:  Patient Vitals for the past 24 hrs:   BP Temp Pulse Resp SpO2 Weight   22 1045 -- -- 66 -- 94 % --   22 1030 123/57 -- 70 -- 96 % --   22 0915 -- -- 75 -- 97 % --   22 0900 (!) 149/67 -- 75 -- 97 % --   22 0710 (!) 146/79 -- -- -- 97 % --   22 0708 -- 98.8  F (37.1  C) 74 16 -- 86.2 kg (190 lb)       PHYSICAL EXAM    GENERAL: Awake, alert.  In no acute distress.   HEENT: Normocephalic, atraumatic.  Pupils equal, round and reactive.  Conjunctiva normal.  EOMI.  NECK: No stridor or apparent deformity.  PULMONARY: Symmetrical breath sounds without distress.  Lungs clear to auscultation bilaterally without wheezes, rhonchi or rales.  CARDIO: Regular rate and rhythm.  No significant murmur, rub or gallop.  Radial pulses strong and symmetrical.  ABDOMINAL: Abdomen soft, non-distended and with LLQ tenderness to palpation.  No CVAT, no palpable hepatosplenomegaly.  EXTREMITIES: No lower extremity swelling or edema.    NEURO: Alert and oriented to person, place and time.  Cranial nerves grossly intact.  No focal motor deficit.  PSYCH: Normal mood and affect  SKIN: No rashes      LAB:  All pertinent labs reviewed and interpreted.  Results for orders placed or performed during the hospital encounter of 22   CT Abdomen Pelvis w  Contrast    Impression    IMPRESSION:   1.  Left adnexal mass measures 6.1 cm. There is a small amount of free fluid in the pelvis. Additionally, there is engorgement of the left gonadal vessels with questionable swirling of the vessels. Given the engorgement of the vessels and possible   swirling of the vessels, this could also represent a lead point for torsion of this mass. Recommend further evaluation with pelvic ultrasound.  2.  Tiny hiatal hernia.    Findings discussed with Piper Mendez on 07/16/2022 at 9:12 AM.    US Pelvic Complete with Transvaginal    Impression    IMPRESSION:  1.  5.5 cm left adnexal mass with dopplerable arterial and venous flow and moderate amount of free anechoic fluid. Differential considerations would favor solid ovarian mass such as a stromal tumor +/- intermittent torsion. Presence of free fluid could   be related to torsion or malignancy. Recommend gynecology consult. MRI may be helpful for further characterization prior to any intervention.  2.  7 mm endometrial polyp with small amount of anechoic fluid  otherwise thin endometrial layers.    Report discussed with Dr. Ackerman at the [facility] Emergency Department.         Comprehensive metabolic panel   Result Value Ref Range    Sodium 141 136 - 145 mmol/L    Potassium 3.6 3.5 - 5.0 mmol/L    Chloride 107 98 - 107 mmol/L    Carbon Dioxide (CO2) 25 22 - 31 mmol/L    Anion Gap 9 5 - 18 mmol/L    Urea Nitrogen 14 8 - 22 mg/dL    Creatinine 0.80 0.60 - 1.10 mg/dL    Calcium 9.7 8.5 - 10.5 mg/dL    Glucose 129 (H) 70 - 125 mg/dL    Alkaline Phosphatase 108 45 - 120 U/L    AST 18 0 - 40 U/L    ALT 29 0 - 45 U/L    Protein Total 7.1 6.0 - 8.0 g/dL    Albumin 4.0 3.5 - 5.0 g/dL    Bilirubin Total 0.5 0.0 - 1.0 mg/dL    GFR Estimate 79 >60 mL/min/1.73m2   Result Value Ref Range    Lipase 28 0 - 52 U/L   UA with Microscopic reflex to Culture    Specimen: Urine, Clean Catch   Result Value Ref Range    Color Urine Colorless  Colorless, Straw, Light Yellow, Yellow    Appearance Urine Clear Clear    Glucose Urine Negative Negative mg/dL    Bilirubin Urine Negative Negative    Ketones Urine Negative Negative mg/dL    Specific Gravity Urine 1.049 (H) 1.001 - 1.030    Blood Urine Negative Negative    pH Urine 6.5 5.0 - 7.0    Protein Albumin Urine Negative Negative mg/dL    Urobilinogen Urine <2.0 <2.0 mg/dL    Nitrite Urine Negative Negative    Leukocyte Esterase Urine Negative Negative    Mucus Urine Present (A) None Seen /LPF    RBC Urine 1 <=2 /HPF    WBC Urine 0 <=5 /HPF   CBC with platelets and differential   Result Value Ref Range    WBC Count 10.8 4.0 - 11.0 10e3/uL    RBC Count 4.93 3.80 - 5.20 10e6/uL    Hemoglobin 14.6 11.7 - 15.7 g/dL    Hematocrit 42.3 35.0 - 47.0 %    MCV 86 78 - 100 fL    MCH 29.6 26.5 - 33.0 pg    MCHC 34.5 31.5 - 36.5 g/dL    RDW 13.1 10.0 - 15.0 %    Platelet Count 287 150 - 450 10e3/uL    % Neutrophils 85 %    % Lymphocytes 7 %    % Monocytes 5 %    % Eosinophils 1 %    % Basophils 1 %    % Immature Granulocytes 1 %    NRBCs per 100 WBC 0 <1 /100    Absolute Neutrophils 9.2 (H) 1.6 - 8.3 10e3/uL    Absolute Lymphocytes 0.7 (L) 0.8 - 5.3 10e3/uL    Absolute Monocytes 0.6 0.0 - 1.3 10e3/uL    Absolute Eosinophils 0.1 0.0 - 0.7 10e3/uL    Absolute Basophils 0.1 0.0 - 0.2 10e3/uL    Absolute Immature Granulocytes 0.1 <=0.4 10e3/uL    Absolute NRBCs 0.0 10e3/uL   Extra Blue Top Tube   Result Value Ref Range    Hold Specimen JI    Extra Red Top Tube   Result Value Ref Range    Hold Specimen JI    Asymptomatic COVID-19 Virus (Coronavirus) by PCR Nasopharyngeal    Specimen: Nasopharyngeal; Swab   Result Value Ref Range    SARS CoV2 PCR Negative Negative       RADIOLOGY:  Reviewed all pertinent imaging. Please see official radiology report.  US Pelvic Complete with Transvaginal   Final Result   IMPRESSION:   1.  5.5 cm left adnexal mass with dopplerable arterial and venous flow and moderate amount of free  anechoic fluid. Differential considerations would favor solid ovarian mass such as a stromal tumor +/- intermittent torsion. Presence of free fluid could    be related to torsion or malignancy. Recommend gynecology consult. MRI may be helpful for further characterization prior to any intervention.   2.  7 mm endometrial polyp with small amount of anechoic fluid  otherwise thin endometrial layers.      Report discussed with Dr. Ackerman at the [facility] Emergency Department.               CT Abdomen Pelvis w Contrast   Final Result   IMPRESSION:    1.  Left adnexal mass measures 6.1 cm. There is a small amount of free fluid in the pelvis. Additionally, there is engorgement of the left gonadal vessels with questionable swirling of the vessels. Given the engorgement of the vessels and possible    swirling of the vessels, this could also represent a lead point for torsion of this mass. Recommend further evaluation with pelvic ultrasound.   2.  Tiny hiatal hernia.      Findings discussed with Piper Mendez on 07/16/2022 at 9:12 AM.                   I, Symone Faulkner, am serving as a scribe to document services personally performed by Dr. Piper Mendez based on my observation and the provider's statements to me. IPiper MD attest that Symone Faulkner is acting in a scribe capacity, has observed my performance of the services and has documented them in accordance with my direction.      Piper Mendez MD  07/16/22 7663       Piper Mendez MD  07/16/22 4080

## 2022-07-16 NOTE — DISCHARGE INSTRUCTIONS
If you have abdominal pain that returns, please come immediately back to the ER at [facility]. Our ob/gyn Dr Amador will talk to you Monday to check up on the result of your ovary blood test.

## 2022-07-17 ENCOUNTER — HEALTH MAINTENANCE LETTER (OUTPATIENT)
Age: 67
End: 2022-07-17

## 2022-07-17 NOTE — CONSULTS
Consult Date: 2022    HISTORY:  The patient is a 67-year-old para 0, menopausal for years, who presented to the Emergency Room with acute onset of left lower quadrant abdominal pain.  The patient states over the last few days she has been feeling twinges, and threw up last night. The pain intensified this morning and she came to the ER.  She was given 0.5 of Dilaudid and then a repeat dose of 1 mg and after ultrasound and return to the ER, basically has no pain.  At this point, the patient is hungry.  Denies any pain, is not nauseated.    PAST MEDICAL HISTORY:  1.  Hospitalizations for back surgery where she had what sounds like a disk.  No other hospitalizations.  2.  Surgeries as stated above.  3.  Habits:  Does smoke 1 pack per day and drinks socially.    SOCIAL HISTORY:  She is  and has her wife here.  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    ALLERGIES:  SULFA.    MEDICATIONS:  A cholesterol medication.     She recently had a complete annual with Dr. Gallo.    ILLNESSES:  Denies history of high blood pressure, heart disease, lung disease, kidney disease, diabetes, gallstones, migraine headaches, blood clots, liver problems or asthma.    FAMILY HISTORY:  She does have a brother, whose daughter  at 32 with breast cancer, which could come from the niece's other side, her mother's side.  There is no colon cancer.  She did have a brother that had bladder cancer, another brother who  of a heart attack.  There is no uterine, ovarian or skin cancer.    PHYSICAL EXAM:  VITAL SIGNS:  Stable.  LUNGS:  Clear.  CARDIOVASCULAR:  Normal S1, S2, without murmur.  ABDOMEN:  She does not have an acute abdomen.  She has decreased bowel sounds, but no rebound or guarding.  PELVIC:  One finger exam and she is completely nontender.   RECTOVAGINAL:  Deferred.    LABORATORY DATA:  Her sodium is 141, potassium 3.6, creatinine is 0.8, BUN 14, calcium 9.7, alkaline phosphatase is 108, ALT  is 29, AST is 18, lipase is 28.  Her hemoglobin was 14.6, white blood cell count 10,800, platelets were 287.  Her COVID test was negative.  UA is concentrated, but no RBCs.  CT initially showed small hiatal hernia, stable pulmonary micronodules with no change in 4 years. Pelvic organs showed a left adnexal solid-appearing mass at 61x4.2x5.7.  Small amount of fluid around the left ovary. Vessels seem engorged.  However, ultrasound shows a 5.5 cm adnexal mass with good arterial and venous flow and she also has a 7 mm endometrial polyp.  The 5.5 cm left adnexal mass is solid.    ASSESSMENT:  A 67-year-old with acute onset of abdominopelvic pain, which is now resolved, that has a 5.5 cm solid ovarian lesion.  At this point, she does not have an acute abdomen.  Can move from side to side without any pain, is over 2 hours out from pain medications, is reliable, and lives close.  I did tell her that we could go ahead with a bilateral salpingo-oophorectomy, a dilatation and curettage; however, if it did come back with malignancy, she would then need a second surgery for nodes and also hysterectomy.  At this point, since she has no pain we will do a CA-125.  If this is normal, we will proceed as an outpatient with a bilateral salpingo-oophorectomy and a dilatation and curettage, hysteroscopy due to the 7 mm polyp.  If, however, the CA-125 is elevated, she will be referred to [facility].  Risks, benefits, and alternatives were reviewed with the patient, questions answered and she was okay with the plan and felt fine now, and if any recurrent pain whatsoever can come back to the ER and then at that point, would recommend going ahead with a bilateral salpingo-oophorectomy.    Jaki Amador MD        D: 2022   T: 2022   MT: valente/omayra    Name:     ELIUD GARNETT  MRN:      [id number]        Account:      [id number]   :      1955           Consult Date: 2022     Document: [id number]    cc:   [facility]

## 2022-07-20 ENCOUNTER — ANESTHESIA EVENT (OUTPATIENT)
Dept: SURGERY | Facility: CLINIC | Age: 67
End: 2022-07-20
Payer: COMMERCIAL

## 2022-07-21 ENCOUNTER — PATIENT OUTREACH (OUTPATIENT)
Dept: INTERNAL MEDICINE | Facility: CLINIC | Age: 67
End: 2022-07-21

## 2022-07-21 ENCOUNTER — HOSPITAL ENCOUNTER (OUTPATIENT)
Facility: CLINIC | Age: 67
Discharge: HOME | End: 2022-07-22
Attending: OBSTETRICS & GYNECOLOGY | Admitting: OBSTETRICS & GYNECOLOGY
Payer: COMMERCIAL

## 2022-07-21 ENCOUNTER — ANESTHESIA (OUTPATIENT)
Dept: SURGERY | Facility: CLINIC | Age: 67
End: 2022-07-21
Payer: COMMERCIAL

## 2022-07-21 DIAGNOSIS — N83.8 OVARIAN MASS: Primary | ICD-10-CM

## 2022-07-21 LAB
GLUCOSE BLDC GLUCOMTR-MCNC: 163 MG/DL (ref 70–99)
HGB BLD-MCNC: 14.6 G/DL (ref 11.7–15.7)

## 2022-07-21 PROCEDURE — 88112 CYTOPATH CELL ENHANCE TECH: CPT | Mod: TC | Performed by: OBSTETRICS & GYNECOLOGY

## 2022-07-21 PROCEDURE — 272N000001 HC OR GENERAL SUPPLY STERILE: Performed by: OBSTETRICS & GYNECOLOGY

## 2022-07-21 PROCEDURE — 250N000009 HC RX 250: Performed by: NURSE ANESTHETIST, CERTIFIED REGISTERED

## 2022-07-21 PROCEDURE — 710N000010 HC RECOVERY PHASE 1, LEVEL 2, PER MIN: Performed by: OBSTETRICS & GYNECOLOGY

## 2022-07-21 PROCEDURE — 36415 COLL VENOUS BLD VENIPUNCTURE: CPT | Performed by: PHYSICIAN ASSISTANT

## 2022-07-21 PROCEDURE — 88305 TISSUE EXAM BY PATHOLOGIST: CPT | Mod: 26 | Performed by: PATHOLOGY

## 2022-07-21 PROCEDURE — 250N000011 HC RX IP 250 OP 636: Performed by: NURSE ANESTHETIST, CERTIFIED REGISTERED

## 2022-07-21 PROCEDURE — 88360 TUMOR IMMUNOHISTOCHEM/MANUAL: CPT | Mod: 26 | Performed by: PATHOLOGY

## 2022-07-21 PROCEDURE — 250N000009 HC RX 250: Performed by: ANESTHESIOLOGY

## 2022-07-21 PROCEDURE — 360N000077 HC SURGERY LEVEL 4, PER MIN: Performed by: OBSTETRICS & GYNECOLOGY

## 2022-07-21 PROCEDURE — 250N000013 HC RX MED GY IP 250 OP 250 PS 637: Performed by: PHYSICIAN ASSISTANT

## 2022-07-21 PROCEDURE — 258N000003 HC RX IP 258 OP 636: Performed by: NURSE ANESTHETIST, CERTIFIED REGISTERED

## 2022-07-21 PROCEDURE — 85018 HEMOGLOBIN: CPT | Performed by: PHYSICIAN ASSISTANT

## 2022-07-21 PROCEDURE — 250N000025 HC SEVOFLURANE, PER MIN: Performed by: OBSTETRICS & GYNECOLOGY

## 2022-07-21 PROCEDURE — 82962 GLUCOSE BLOOD TEST: CPT

## 2022-07-21 PROCEDURE — 258N000003 HC RX IP 258 OP 636: Performed by: ANESTHESIOLOGY

## 2022-07-21 PROCEDURE — 88305 TISSUE EXAM BY PATHOLOGIST: CPT | Mod: TC | Performed by: OBSTETRICS & GYNECOLOGY

## 2022-07-21 PROCEDURE — 250N000011 HC RX IP 250 OP 636: Performed by: OBSTETRICS & GYNECOLOGY

## 2022-07-21 PROCEDURE — 250N000011 HC RX IP 250 OP 636: Performed by: ANESTHESIOLOGY

## 2022-07-21 PROCEDURE — 88112 CYTOPATH CELL ENHANCE TECH: CPT | Mod: 26 | Performed by: PATHOLOGY

## 2022-07-21 PROCEDURE — 88342 IMHCHEM/IMCYTCHM 1ST ANTB: CPT | Mod: 26 | Performed by: PATHOLOGY

## 2022-07-21 PROCEDURE — 250N000009 HC RX 250: Performed by: OBSTETRICS & GYNECOLOGY

## 2022-07-21 PROCEDURE — 999N000141 HC STATISTIC PRE-PROCEDURE NURSING ASSESSMENT: Performed by: OBSTETRICS & GYNECOLOGY

## 2022-07-21 PROCEDURE — 370N000017 HC ANESTHESIA TECHNICAL FEE, PER MIN: Performed by: OBSTETRICS & GYNECOLOGY

## 2022-07-21 RX ORDER — ACETAMINOPHEN 325 MG/1
975 TABLET ORAL EVERY 6 HOURS PRN
Qty: 50 TABLET | Refills: 0 | COMMUNITY
Start: 2022-07-21 | End: 2022-07-22

## 2022-07-21 RX ORDER — ACETAMINOPHEN 325 MG/1
975 TABLET ORAL ONCE
Status: COMPLETED | OUTPATIENT
Start: 2022-07-21 | End: 2022-07-21

## 2022-07-21 RX ORDER — OXYCODONE HYDROCHLORIDE 5 MG/1
5-10 TABLET ORAL EVERY 4 HOURS PRN
Qty: 6 TABLET | Refills: 0 | Status: SHIPPED | OUTPATIENT
Start: 2022-07-21 | End: 2023-11-13

## 2022-07-21 RX ORDER — OXYCODONE HYDROCHLORIDE 5 MG/1
5 TABLET ORAL
Status: DISCONTINUED | OUTPATIENT
Start: 2022-07-21 | End: 2022-07-21 | Stop reason: ALTCHOICE

## 2022-07-21 RX ORDER — HYDROMORPHONE HCL IN WATER/PF 6 MG/30 ML
.2-.5 PATIENT CONTROLLED ANALGESIA SYRINGE INTRAVENOUS EVERY 5 MIN PRN
Status: DISCONTINUED | OUTPATIENT
Start: 2022-07-21 | End: 2022-07-21 | Stop reason: HOSPADM

## 2022-07-21 RX ORDER — LIDOCAINE HYDROCHLORIDE 10 MG/ML
INJECTION, SOLUTION INFILTRATION; PERINEURAL PRN
Status: DISCONTINUED | OUTPATIENT
Start: 2022-07-21 | End: 2022-07-21

## 2022-07-21 RX ORDER — PROCHLORPERAZINE MALEATE 5 MG
5 TABLET ORAL EVERY 6 HOURS PRN
Status: DISCONTINUED | OUTPATIENT
Start: 2022-07-21 | End: 2022-07-22 | Stop reason: HOSPADM

## 2022-07-21 RX ORDER — BUPIVACAINE HYDROCHLORIDE AND EPINEPHRINE 2.5; 5 MG/ML; UG/ML
INJECTION, SOLUTION EPIDURAL; INFILTRATION; INTRACAUDAL; PERINEURAL PRN
Status: DISCONTINUED | OUTPATIENT
Start: 2022-07-21 | End: 2022-07-21 | Stop reason: HOSPADM

## 2022-07-21 RX ORDER — FENTANYL CITRATE 50 UG/ML
INJECTION, SOLUTION INTRAMUSCULAR; INTRAVENOUS PRN
Status: DISCONTINUED | OUTPATIENT
Start: 2022-07-21 | End: 2022-07-21

## 2022-07-21 RX ORDER — OXYCODONE HYDROCHLORIDE 5 MG/1
10 TABLET ORAL EVERY 4 HOURS PRN
Status: DISCONTINUED | OUTPATIENT
Start: 2022-07-21 | End: 2022-07-22 | Stop reason: HOSPADM

## 2022-07-21 RX ORDER — KETOROLAC TROMETHAMINE 30 MG/ML
15 INJECTION, SOLUTION INTRAMUSCULAR; INTRAVENOUS ONCE
Status: COMPLETED | OUTPATIENT
Start: 2022-07-21 | End: 2022-07-21

## 2022-07-21 RX ORDER — BUPIVACAINE HYDROCHLORIDE 2.5 MG/ML
INJECTION, SOLUTION INFILTRATION; PERINEURAL PRN
Status: DISCONTINUED | OUTPATIENT
Start: 2022-07-21 | End: 2022-07-21 | Stop reason: HOSPADM

## 2022-07-21 RX ORDER — IBUPROFEN 600 MG/1
600 TABLET, FILM COATED ORAL EVERY 6 HOURS PRN
Qty: 30 TABLET | Refills: 0 | Status: SHIPPED | OUTPATIENT
Start: 2022-07-21 | End: 2023-11-13

## 2022-07-21 RX ORDER — ONDANSETRON 2 MG/ML
INJECTION INTRAMUSCULAR; INTRAVENOUS PRN
Status: DISCONTINUED | OUTPATIENT
Start: 2022-07-21 | End: 2022-07-21

## 2022-07-21 RX ORDER — ACETAMINOPHEN 325 MG/1
975 TABLET ORAL EVERY 6 HOURS PRN
Qty: 50 TABLET | Refills: 0 | Status: SHIPPED | OUTPATIENT
Start: 2022-07-21 | End: 2023-11-13

## 2022-07-21 RX ORDER — LIDOCAINE 40 MG/G
CREAM TOPICAL
Status: DISCONTINUED | OUTPATIENT
Start: 2022-07-21 | End: 2022-07-22 | Stop reason: HOSPADM

## 2022-07-21 RX ORDER — CEFAZOLIN SODIUM/WATER 2 G/20 ML
SYRINGE (ML) INTRAVENOUS PRN
Status: DISCONTINUED | OUTPATIENT
Start: 2022-07-21 | End: 2022-07-21

## 2022-07-21 RX ORDER — EPHEDRINE SULFATE 50 MG/ML
INJECTION, SOLUTION INTRAMUSCULAR; INTRAVENOUS; SUBCUTANEOUS PRN
Status: DISCONTINUED | OUTPATIENT
Start: 2022-07-21 | End: 2022-07-21

## 2022-07-21 RX ORDER — IBUPROFEN 600 MG/1
600 TABLET, FILM COATED ORAL ONCE
Status: DISCONTINUED | OUTPATIENT
Start: 2022-07-22 | End: 2022-07-21 | Stop reason: ALTCHOICE

## 2022-07-21 RX ORDER — LIDOCAINE 40 MG/G
CREAM TOPICAL
Status: DISCONTINUED | OUTPATIENT
Start: 2022-07-21 | End: 2022-07-21 | Stop reason: HOSPADM

## 2022-07-21 RX ORDER — OXYCODONE HYDROCHLORIDE 5 MG/1
5 TABLET ORAL EVERY 4 HOURS PRN
Status: DISCONTINUED | OUTPATIENT
Start: 2022-07-21 | End: 2022-07-21 | Stop reason: HOSPADM

## 2022-07-21 RX ORDER — CEFAZOLIN SODIUM 1 G/3ML
INJECTION, POWDER, FOR SOLUTION INTRAMUSCULAR; INTRAVENOUS
Status: DISCONTINUED
Start: 2022-07-21 | End: 2022-07-21 | Stop reason: HOSPADM

## 2022-07-21 RX ORDER — ONDANSETRON 4 MG/1
4 TABLET, ORALLY DISINTEGRATING ORAL EVERY 6 HOURS PRN
Status: DISCONTINUED | OUTPATIENT
Start: 2022-07-21 | End: 2022-07-22 | Stop reason: HOSPADM

## 2022-07-21 RX ORDER — KETAMINE HYDROCHLORIDE 10 MG/ML
INJECTION INTRAMUSCULAR; INTRAVENOUS PRN
Status: DISCONTINUED | OUTPATIENT
Start: 2022-07-21 | End: 2022-07-21

## 2022-07-21 RX ORDER — NALOXONE HYDROCHLORIDE 0.4 MG/ML
0.2 INJECTION, SOLUTION INTRAMUSCULAR; INTRAVENOUS; SUBCUTANEOUS
Status: DISCONTINUED | OUTPATIENT
Start: 2022-07-21 | End: 2022-07-22 | Stop reason: HOSPADM

## 2022-07-21 RX ORDER — FENTANYL CITRATE 50 UG/ML
25-50 INJECTION, SOLUTION INTRAMUSCULAR; INTRAVENOUS EVERY 5 MIN PRN
Status: DISCONTINUED | OUTPATIENT
Start: 2022-07-21 | End: 2022-07-21 | Stop reason: HOSPADM

## 2022-07-21 RX ORDER — SODIUM CHLORIDE, SODIUM LACTATE, POTASSIUM CHLORIDE, CALCIUM CHLORIDE 600; 310; 30; 20 MG/100ML; MG/100ML; MG/100ML; MG/100ML
INJECTION, SOLUTION INTRAVENOUS CONTINUOUS
Status: DISCONTINUED | OUTPATIENT
Start: 2022-07-21 | End: 2022-07-21 | Stop reason: HOSPADM

## 2022-07-21 RX ORDER — BISACODYL 10 MG
10 SUPPOSITORY, RECTAL RECTAL DAILY PRN
Status: DISCONTINUED | OUTPATIENT
Start: 2022-07-21 | End: 2022-07-22 | Stop reason: HOSPADM

## 2022-07-21 RX ORDER — FENTANYL CITRATE 50 UG/ML
25 INJECTION, SOLUTION INTRAMUSCULAR; INTRAVENOUS
Status: CANCELLED | OUTPATIENT
Start: 2022-07-21

## 2022-07-21 RX ORDER — ONDANSETRON 4 MG/1
4 TABLET, ORALLY DISINTEGRATING ORAL EVERY 30 MIN PRN
Status: DISCONTINUED | OUTPATIENT
Start: 2022-07-21 | End: 2022-07-21 | Stop reason: HOSPADM

## 2022-07-21 RX ORDER — NALOXONE HYDROCHLORIDE 0.4 MG/ML
0.4 INJECTION, SOLUTION INTRAMUSCULAR; INTRAVENOUS; SUBCUTANEOUS
Status: DISCONTINUED | OUTPATIENT
Start: 2022-07-21 | End: 2022-07-22 | Stop reason: HOSPADM

## 2022-07-21 RX ORDER — ACETAMINOPHEN 325 MG/1
650 TABLET ORAL EVERY 6 HOURS
Status: DISCONTINUED | OUTPATIENT
Start: 2022-07-25 | End: 2022-07-22 | Stop reason: HOSPADM

## 2022-07-21 RX ORDER — ACETAMINOPHEN 325 MG/1
975 TABLET ORAL ONCE
Status: DISCONTINUED | OUTPATIENT
Start: 2022-07-21 | End: 2022-07-21

## 2022-07-21 RX ORDER — KETOROLAC TROMETHAMINE 15 MG/ML
15 INJECTION, SOLUTION INTRAMUSCULAR; INTRAVENOUS EVERY 6 HOURS
Status: DISCONTINUED | OUTPATIENT
Start: 2022-07-22 | End: 2022-07-22 | Stop reason: HOSPADM

## 2022-07-21 RX ORDER — AMOXICILLIN 250 MG
1 CAPSULE ORAL 2 TIMES DAILY
Status: DISCONTINUED | OUTPATIENT
Start: 2022-07-21 | End: 2022-07-22 | Stop reason: HOSPADM

## 2022-07-21 RX ORDER — ACETAMINOPHEN 325 MG/1
975 TABLET ORAL ONCE
Status: DISCONTINUED | OUTPATIENT
Start: 2022-07-22 | End: 2022-07-21 | Stop reason: ALTCHOICE

## 2022-07-21 RX ORDER — ONDANSETRON 2 MG/ML
4 INJECTION INTRAMUSCULAR; INTRAVENOUS EVERY 30 MIN PRN
Status: DISCONTINUED | OUTPATIENT
Start: 2022-07-21 | End: 2022-07-21 | Stop reason: HOSPADM

## 2022-07-21 RX ORDER — ACETAMINOPHEN 325 MG/1
975 TABLET ORAL EVERY 6 HOURS
Status: DISCONTINUED | OUTPATIENT
Start: 2022-07-22 | End: 2022-07-22 | Stop reason: HOSPADM

## 2022-07-21 RX ORDER — DEXAMETHASONE SODIUM PHOSPHATE 10 MG/ML
INJECTION, SOLUTION INTRAMUSCULAR; INTRAVENOUS PRN
Status: DISCONTINUED | OUTPATIENT
Start: 2022-07-21 | End: 2022-07-21

## 2022-07-21 RX ORDER — IBUPROFEN 600 MG/1
600 TABLET, FILM COATED ORAL EVERY 6 HOURS
Status: DISCONTINUED | OUTPATIENT
Start: 2022-07-22 | End: 2022-07-22 | Stop reason: HOSPADM

## 2022-07-21 RX ORDER — MAGNESIUM SULFATE 4 G/50ML
4 INJECTION INTRAVENOUS ONCE
Status: COMPLETED | OUTPATIENT
Start: 2022-07-21 | End: 2022-07-21

## 2022-07-21 RX ORDER — PROPOFOL 10 MG/ML
INJECTION, EMULSION INTRAVENOUS PRN
Status: DISCONTINUED | OUTPATIENT
Start: 2022-07-21 | End: 2022-07-21

## 2022-07-21 RX ORDER — IBUPROFEN 600 MG/1
600 TABLET, FILM COATED ORAL EVERY 6 HOURS PRN
Qty: 30 TABLET | Refills: 0 | COMMUNITY
Start: 2022-07-21 | End: 2022-07-22

## 2022-07-21 RX ORDER — OXYCODONE HYDROCHLORIDE 5 MG/1
5 TABLET ORAL EVERY 4 HOURS PRN
Status: DISCONTINUED | OUTPATIENT
Start: 2022-07-21 | End: 2022-07-22 | Stop reason: HOSPADM

## 2022-07-21 RX ORDER — ONDANSETRON 2 MG/ML
4 INJECTION INTRAMUSCULAR; INTRAVENOUS EVERY 6 HOURS PRN
Status: DISCONTINUED | OUTPATIENT
Start: 2022-07-21 | End: 2022-07-22 | Stop reason: HOSPADM

## 2022-07-21 RX ORDER — HALOPERIDOL 5 MG/ML
1 INJECTION INTRAMUSCULAR
Status: DISCONTINUED | OUTPATIENT
Start: 2022-07-21 | End: 2022-07-21 | Stop reason: HOSPADM

## 2022-07-21 RX ADMIN — PHENYLEPHRINE HYDROCHLORIDE 100 MCG: 10 INJECTION INTRAVENOUS at 15:35

## 2022-07-21 RX ADMIN — KETOROLAC TROMETHAMINE 15 MG: 30 INJECTION, SOLUTION INTRAMUSCULAR at 20:43

## 2022-07-21 RX ADMIN — ACETAMINOPHEN 975 MG: 325 TABLET ORAL at 14:19

## 2022-07-21 RX ADMIN — ROCURONIUM BROMIDE 40 MG: 10 INJECTION, SOLUTION INTRAVENOUS at 15:28

## 2022-07-21 RX ADMIN — ONDANSETRON 4 MG: 2 INJECTION INTRAMUSCULAR; INTRAVENOUS at 15:26

## 2022-07-21 RX ADMIN — MIDAZOLAM 2 MG: 1 INJECTION INTRAMUSCULAR; INTRAVENOUS at 15:18

## 2022-07-21 RX ADMIN — SODIUM CHLORIDE, POTASSIUM CHLORIDE, SODIUM LACTATE AND CALCIUM CHLORIDE: 600; 310; 30; 20 INJECTION, SOLUTION INTRAVENOUS at 20:46

## 2022-07-21 RX ADMIN — FENTANYL CITRATE 50 MCG: 50 INJECTION, SOLUTION INTRAMUSCULAR; INTRAVENOUS at 20:30

## 2022-07-21 RX ADMIN — DEXAMETHASONE SODIUM PHOSPHATE 10 MG: 10 INJECTION, SOLUTION INTRAMUSCULAR; INTRAVENOUS at 15:26

## 2022-07-21 RX ADMIN — FENTANYL CITRATE 150 MCG: 50 INJECTION, SOLUTION INTRAMUSCULAR; INTRAVENOUS at 15:23

## 2022-07-21 RX ADMIN — PROPOFOL 150 MG: 10 INJECTION, EMULSION INTRAVENOUS at 15:28

## 2022-07-21 RX ADMIN — FENTANYL CITRATE 50 MCG: 50 INJECTION, SOLUTION INTRAMUSCULAR; INTRAVENOUS at 20:37

## 2022-07-21 RX ADMIN — FENTANYL CITRATE 50 MCG: 50 INJECTION, SOLUTION INTRAMUSCULAR; INTRAVENOUS at 16:26

## 2022-07-21 RX ADMIN — HYDROMORPHONE HYDROCHLORIDE 1 MG: 1 INJECTION, SOLUTION INTRAMUSCULAR; INTRAVENOUS; SUBCUTANEOUS at 15:33

## 2022-07-21 RX ADMIN — MAGNESIUM SULFATE HEPTAHYDRATE 4 G: 4 INJECTION, SOLUTION INTRAVENOUS at 14:14

## 2022-07-21 RX ADMIN — SUGAMMADEX 200 MG: 100 INJECTION, SOLUTION INTRAVENOUS at 19:54

## 2022-07-21 RX ADMIN — Medication 2 G: at 15:19

## 2022-07-21 RX ADMIN — SODIUM CHLORIDE, POTASSIUM CHLORIDE, SODIUM LACTATE AND CALCIUM CHLORIDE: 600; 310; 30; 20 INJECTION, SOLUTION INTRAVENOUS at 16:08

## 2022-07-21 RX ADMIN — ROCURONIUM BROMIDE 10 MG: 10 INJECTION, SOLUTION INTRAVENOUS at 17:12

## 2022-07-21 RX ADMIN — Medication 10 MG: at 15:55

## 2022-07-21 RX ADMIN — KETAMINE HYDROCHLORIDE 50 MG: 10 INJECTION, SOLUTION INTRAMUSCULAR; INTRAVENOUS at 15:26

## 2022-07-21 RX ADMIN — PHENYLEPHRINE HYDROCHLORIDE 100 MCG: 10 INJECTION INTRAVENOUS at 15:38

## 2022-07-21 RX ADMIN — LIDOCAINE HYDROCHLORIDE 50 ML: 10 INJECTION, SOLUTION INFILTRATION; PERINEURAL at 15:26

## 2022-07-21 RX ADMIN — PHENYLEPHRINE HYDROCHLORIDE 100 MCG: 10 INJECTION INTRAVENOUS at 15:33

## 2022-07-21 RX ADMIN — ROCURONIUM BROMIDE 10 MG: 10 INJECTION, SOLUTION INTRAVENOUS at 15:53

## 2022-07-21 RX ADMIN — Medication 2 G: at 19:11

## 2022-07-21 RX ADMIN — SODIUM CHLORIDE, POTASSIUM CHLORIDE, SODIUM LACTATE AND CALCIUM CHLORIDE: 600; 310; 30; 20 INJECTION, SOLUTION INTRAVENOUS at 14:14

## 2022-07-21 ASSESSMENT — LIFESTYLE VARIABLES: TOBACCO_USE: 1

## 2022-07-21 NOTE — ANESTHESIA PREPROCEDURE EVALUATION
Anesthesia Pre-Procedure Evaluation    Patient: Anat Phillips   MRN: [id number] : 1955        Procedure : Procedure(s):  LAPAROSCOPY BILATERAL SALPINGO OOPHORECTOMY  HYSTEROSCOPY DILATION CURETTAGE WITH ULTRASOUND GUIDANCE          History reviewed. No pertinent past medical history.   Past Surgical History:   Procedure Laterality Date     IR LUMBAR EPIDURAL STEROID INJECTION  10/12/2004     OTHER SURGICAL HISTORY  2015    L5 Radiculopathy      Allergies   Allergen Reactions     Sulfa (Sulfonamide Antibiotics) [Sulfa Drugs] Unknown      Social History     Tobacco Use     Smoking status: Current Every Day Smoker     Packs/day: 0.50     Types: Cigarettes, Cigarettes     Smokeless tobacco: Never Used   Substance Use Topics     Alcohol use: Yes     Alcohol/week: 5.8 standard drinks      Wt Readings from Last 1 Encounters:   22 86.2 kg (190 lb)        Anesthesia Evaluation   Pt has had prior anesthetic.     No history of anesthetic complications       ROS/MED HX  ENT/Pulmonary:     (+) tobacco use, Current use, 1 packs/day,     Neurologic: Comment: Lumbar radiculopathy      Cardiovascular:       METS/Exercise Tolerance: >4 METS    Hematologic:  - neg hematologic  ROS     Musculoskeletal:  - neg musculoskeletal ROS     GI/Hepatic:     (+) GERD (intermittent),     Renal/Genitourinary:  - neg Renal ROS     Endo:     (+) Obesity,     Psychiatric/Substance Use:       Infectious Disease:  - neg infectious disease ROS     Malignancy:       Other:            Physical Exam    Airway        Mallampati: IV   TM distance: > 3 FB   Neck ROM: full     Respiratory Devices and Support         Dental       (+) chipped      Cardiovascular          Rhythm and rate: regular     Pulmonary           breath sounds clear to auscultation           OUTSIDE LABS:  CBC:   Lab Results   Component Value Date    WBC 10.8 2022    WBC 6.6 2022    HGB 14.6 2022    HGB 14.5 2022    HCT 42.3 2022    HCT 43.1  06/14/2022     07/16/2022     06/14/2022     BMP:   Lab Results   Component Value Date     07/16/2022     06/14/2022    POTASSIUM 3.6 07/16/2022    POTASSIUM 4.4 06/14/2022    CHLORIDE 107 07/16/2022    CHLORIDE 106 06/14/2022    CO2 25 07/16/2022    CO2 28 06/14/2022    BUN 14 07/16/2022    BUN 12 06/14/2022    CR 0.80 07/16/2022    CR 0.79 06/14/2022     (H) 07/16/2022    GLC 93 06/14/2022     COAGS: No results found for: PTT, INR, FIBR  POC: No results found for: BGM, HCG, HCGS  HEPATIC:   Lab Results   Component Value Date    ALBUMIN 4.0 07/16/2022    PROTTOTAL 7.1 07/16/2022    ALT 29 07/16/2022    AST 18 07/16/2022    ALKPHOS 108 07/16/2022    BILITOTAL 0.5 07/16/2022     OTHER:   Lab Results   Component Value Date    A1C 5.3 06/14/2022    YAMILE 9.7 07/16/2022    LIPASE 28 07/16/2022    TSH 3.03 06/14/2022       Anesthesia Plan    ASA Status:  3   NPO Status:  NPO Appropriate    Anesthesia Type: General.     - Airway: ETT         Techniques and Equipment:     - Airway: Video-Laryngoscope         Consents    Anesthesia Plan(s) and associated risks, benefits, and realistic alternatives discussed. Questions answered and patient/representative(s) expressed understanding.     - Discussed: Risks, Benefits and Alternatives for the PROCEDURE were discussed     - Discussed with:  Patient      - Patient is DNR/DNI Status: No         Postoperative Care    Pain management: Multi-modal analgesia.   PONV prophylaxis: Dexamethasone or Solumedrol, Ondansetron (or other 5HT-3), Background Propofol Infusion     Comments:    Other Comments:     Ketamine 30 mg  Mag gtt            Anabell Hawkins MD

## 2022-07-21 NOTE — PHARMACY-ADMISSION MEDICATION HISTORY
Pharmacy Note - Admission Medication History    Pertinent Provider Information: n/a   ______________________________________________________________________    Prior To Admission (PTA) med list completed and updated in EMR.       PTA Med List   Medication Sig Note Last Dose     Aspirin 81 MG CAPS [ASPIRIN 81 MG CHEWABLE TABLET] Chew 81 mg daily. 7/19/2022: LD 7/17 7/17/2022     atorvastatin (LIPITOR) 20 MG tablet TAKE ONE TABLET BY MOUTH ONE TIME DAILY  7/20/2022 at Unknown time     calcium carbonate/vitamin D3 (CALCIUM 600 WITH VITAMIN D3 ORAL) Take 1 tablet by mouth daily (with breakfast)  Past Month at Unknown time     cetirizine (ZYRTEC) 10 MG tablet Take 10 mg by mouth daily  7/20/2022 at Unknown time     ibuprofen (ADVIL/MOTRIN) 200 MG tablet Take 800 mg by mouth every 6 hours as needed for moderate pain 7/19/2022: LD 7/11 7/16/2022     MULTIVITAMIN (MULTIPLE VITAMIN ORAL) [MULTIVITAMIN (MULTIPLE VITAMIN ORAL)] Take 1 tablet by mouth daily.  Past Week at Unknown time     polyethylene glycol-propylene glycol (SYSTANE ULTRA) 0.4-0.3 % SOLN ophthalmic solution Place 1 drop into both eyes 3 times daily as needed for dry eyes  PRN     Probiotic Product (PROBIOTIC PO) Take 1 capsule by mouth daily  Past Month at Unknown time     pseudoephedrine (SUDAFED) 30 MG tablet [PSEUDOEPHEDRINE (SUDAFED) 30 MG TABLET] Take 30 mg by mouth every 4 (four) hours as needed for congestion.  More than a month at Unknown time       Information source(s): Patient and CareEverywhere/[facility]    Method of interview communication: in-person    Patient was asked about OTC/herbal products specifically.  PTA med list reflects this.    Based on the pharmacist's assessment, the PTA med list information appears reliable    Allergies were reviewed, assessed, and updated with the patient.      Patient does not anticipate needing any multi-use medications during admission.     Thank you for the opportunity to participate in the care of this  patient.      Zhane Guerra, [facility]     7/21/2022     2:11 PM

## 2022-07-21 NOTE — ANESTHESIA PROCEDURE NOTES
Airway       Patient location during procedure: OR       Procedure Start/Stop Times: 7/21/2022 3:31 PM  Staff -        CRNA: Wanda Lordeo APRN CRNA       Performed By: CRNA  Consent for Airway        Urgency: elective  Indications and Patient Condition       Indications for airway management: gertrude-procedural       Induction type:intravenous       Mask difficulty assessment: 1 - vent by mask    Final Airway Details       Final airway type: endotracheal airway       Successful airway: ETT - single  Endotracheal Airway Details        ETT size (mm): 7.0       Cuffed: yes       Successful intubation technique: video laryngoscopy       VL Blade Size: Glidescope 3       Grade View of Cords: 2       Adjucts: stylet       Position: Right       Measured from: lips       Secured at (cm): 21       Bite block used: None    Post intubation assessment        Placement verified by: capnometry, equal breath sounds and chest rise        Number of attempts at approach: 2       Number of other approaches attempted: 1       Secured with: silk tape       Ease of procedure: easy       Dentition: Unchanged    Medication(s) Administered   Medication Administration Time: 7/21/2022 3:31 PM

## 2022-07-22 VITALS
TEMPERATURE: 97.8 F | DIASTOLIC BLOOD PRESSURE: 56 MMHG | HEART RATE: 77 BPM | HEIGHT: 71 IN | WEIGHT: 197 LBS | SYSTOLIC BLOOD PRESSURE: 122 MMHG | BODY MASS INDEX: 27.58 KG/M2 | OXYGEN SATURATION: 98 % | RESPIRATION RATE: 18 BRPM

## 2022-07-22 LAB — GLUCOSE BLDC GLUCOMTR-MCNC: 109 MG/DL (ref 70–99)

## 2022-07-22 PROCEDURE — 250N000013 HC RX MED GY IP 250 OP 250 PS 637: Performed by: OBSTETRICS & GYNECOLOGY

## 2022-07-22 PROCEDURE — 250N000011 HC RX IP 250 OP 636: Performed by: OBSTETRICS & GYNECOLOGY

## 2022-07-22 PROCEDURE — 82962 GLUCOSE BLOOD TEST: CPT

## 2022-07-22 RX ADMIN — SENNOSIDES AND DOCUSATE SODIUM 1 TABLET: 50; 8.6 TABLET ORAL at 08:33

## 2022-07-22 RX ADMIN — KETOROLAC TROMETHAMINE 15 MG: 15 INJECTION, SOLUTION INTRAMUSCULAR; INTRAVENOUS at 02:36

## 2022-07-22 RX ADMIN — IBUPROFEN 600 MG: 600 TABLET ORAL at 08:32

## 2022-07-22 NOTE — PLAN OF CARE
Problem: Plan of Care - These are the overarching goals to be used throughout the patient stay.    Goal: Absence of Hospital-Acquired Illness or Injury  Intervention: Identify and Manage Fall Risk  Recent Flowsheet Documentation  Taken 7/21/2022 2344 by Tammy Kelley RN  Safety Promotion/Fall Prevention:   patient and family education   nonskid shoes/slippers when out of bed   lighting adjusted   clutter free environment maintained   supervised activity     Problem: Plan of Care - These are the overarching goals to be used throughout the patient stay.    Goal: Optimal Comfort and Wellbeing  Outcome: Ongoing, Progressing  Intervention: Monitor Pain and Promote Comfort  Recent Flowsheet Documentation  Taken 7/21/2022 2344 by Tammy Kelley RN  Pain Management Interventions: medication offered but refused     Problem: Plan of Care - These are the overarching goals to be used throughout the patient stay.    Goal: Optimal Comfort and Wellbeing  Intervention: Monitor Pain and Promote Comfort  Recent Flowsheet Documentation  Taken 7/21/2022 2344 by Tammy Kelley RN  Pain Management Interventions: medication offered but refused   Goal Outcome Evaluation:     Patient is alert ad oriented. Verbalized abdominal discomfort but tolerable as per patient. Ketorolac for pain given as scheduled and effective per patient. Dressings to surgical site dry, clean and intact. Ambulates to the bathroom on standby assist. Patient calls appropriately when needing supervision. VSS.

## 2022-07-22 NOTE — PLAN OF CARE
Problem: Plan of Care - These are the overarching goals to be used throughout the patient stay.    Goal: Optimal Comfort and Wellbeing  Outcome: Ongoing, Progressing  Goal Outcome Evaluation:  Pt transferred from PACU, oriented to room and unit routine, pt A&Ox4, report minor abdominal pain but tolerable, LS clear, O2 sat >95%, remained on room air, incentive spirometer initiated, pt tolerating clears, will advanced diet as tolerated, pt have yet to void, will continue to monitor, address and answer question, will give choice when able.

## 2022-07-22 NOTE — ANESTHESIA CARE TRANSFER NOTE
Patient: Anta Phillips    Procedure: Procedure(s):  LAPAROSCOPY BILATERAL SALPINGO OOPHORECTOMY  HYSTEROSCOPY DILATION CURETTAGE       Diagnosis: Ovarian mass [N83.8]  Diagnosis Additional Information: No value filed.    Anesthesia Type:   General     Note:    Oropharynx: oropharynx clear of all foreign objects and spontaneously breathing  Level of Consciousness: drowsy  Oxygen Supplementation: face mask  Level of Supplemental Oxygen (L/min / FiO2): 6  Independent Airway: airway patency satisfactory and stable  Dentition: dentition unchanged  Vital Signs Stable: post-procedure vital signs reviewed and stable  Report to RN Given: handoff report given  Patient transferred to: PACU    Handoff Report: Identifed the Patient, Identified the Reponsible Provider, Reviewed the pertinent medical history, Discussed the surgical course, Reviewed Intra-OP anesthesia mangement and issues during anesthesia, Set expectations for post-procedure period and Allowed opportunity for questions and acknowledgement of understanding      Vitals:  Vitals Value Taken Time   /68 07/21/22 2004   Temp 36.5  C (97.7  F) 07/21/22 2004   Pulse 77 07/21/22 2009   Resp 16 07/21/22 2009   SpO2 100 % 07/21/22 2009   Vitals shown include unvalidated device data.    Electronically Signed By: ROSA Almeida CRNA  July 21, 2022  8:10 PM

## 2022-07-22 NOTE — ANESTHESIA POSTPROCEDURE EVALUATION
Patient: Anat Phillips    Procedure: Procedure(s):  LAPAROSCOPY BILATERAL SALPINGO OOPHORECTOMY  HYSTEROSCOPY DILATION CURETTAGE       Anesthesia Type:  General    Note:     Postop Pain Control: Uneventful            Sign Out: Well controlled pain   PONV: No   Neuro/Psych: Uneventful            Sign Out: Acceptable/Baseline neuro status   Airway/Respiratory: Uneventful            Sign Out: Acceptable/Baseline resp. status   CV/Hemodynamics: Uneventful            Sign Out: Acceptable CV status; No obvious hypovolemia; No obvious fluid overload   Other NRE: NONE   DID A NON-ROUTINE EVENT OCCUR? No           Last vitals:  Vitals Value Taken Time   /66 07/21/22 2030   Temp 37.1  C (98.7  F) 07/21/22 2030   Pulse 68 07/21/22 2039   Resp 15 07/21/22 2039   SpO2 100 % 07/21/22 2039   Vitals shown include unvalidated device data.    Electronically Signed By: Jennifer Gibson MD  July 21, 2022  8:40 PM

## 2022-07-22 NOTE — DISCHARGE SUMMARY
[facility] Discharge Summary    Anat Phillips MRN# [id number]   Age: 67 year old YOB: 1955     Date of Admission:  7/21/2022  Date of Discharge::  7/22/2022   Admitting Physician:  7/22/2022  Discharge Physician:  Mary Jane Key MD     [facility]: [facility]             Admission Diagnoses:   Ovarian mass [N83.8]          Discharge Diagnosis:   Same as above   surgery          Procedures:   Procedure(s):  LAPAROSCOPY BILATERAL SALPINGO OOPHORECTOMY  HYSTEROSCOPY DILATION CURETTAGE                  Medications Prior to Admission:     Medications Prior to Admission   Medication Sig Dispense Refill Last Dose     Aspirin 81 MG CAPS [ASPIRIN 81 MG CHEWABLE TABLET] Chew 81 mg daily.   7/17/2022     atorvastatin (LIPITOR) 20 MG tablet TAKE ONE TABLET BY MOUTH ONE TIME DAILY 90 tablet 3 7/20/2022 at Unknown time     calcium carbonate/vitamin D3 (CALCIUM 600 WITH VITAMIN D3 ORAL) Take 1 tablet by mouth daily (with breakfast)   Past Month at Unknown time     cetirizine (ZYRTEC) 10 MG tablet Take 10 mg by mouth daily   7/20/2022 at Unknown time     ibuprofen (ADVIL/MOTRIN) 200 MG tablet Take 800 mg by mouth every 6 hours as needed for moderate pain   7/16/2022     MULTIVITAMIN (MULTIPLE VITAMIN ORAL) [MULTIVITAMIN (MULTIPLE VITAMIN ORAL)] Take 1 tablet by mouth daily.   Past Week at Unknown time     polyethylene glycol-propylene glycol (SYSTANE ULTRA) 0.4-0.3 % SOLN ophthalmic solution Place 1 drop into both eyes 3 times daily as needed for dry eyes   PRN     Probiotic Product (PROBIOTIC PO) Take 1 capsule by mouth daily   Past Month at Unknown time     pseudoephedrine (SUDAFED) 30 MG tablet [PSEUDOEPHEDRINE (SUDAFED) 30 MG TABLET] Take 30 mg by mouth every 4 (four) hours as needed for congestion.   More than a month at Unknown time             Discharge Medications:     Current Discharge Medication List      START taking these medications    Details   !! acetaminophen  (TYLENOL) 325 MG tablet Take 3 tablets (975 mg) by mouth every 6 hours as needed for mild pain  Qty: 50 tablet, Refills: 0    Associated Diagnoses: Ovarian mass      !! acetaminophen (TYLENOL) 325 MG tablet Take 3 tablets (975 mg) by mouth every 6 hours as needed for mild pain  Qty: 50 tablet, Refills: 0    Associated Diagnoses: Ovarian mass      !! ibuprofen (ADVIL/MOTRIN) 600 MG tablet Take 1 tablet (600 mg) by mouth every 6 hours as needed for other (mild and/or inflammatory pain)  Qty: 30 tablet, Refills: 0    Associated Diagnoses: Ovarian mass      !! ibuprofen (ADVIL/MOTRIN) 600 MG tablet Take 1 tablet (600 mg) by mouth every 6 hours as needed for other (mild and/or inflammatory pain)  Qty: 30 tablet, Refills: 0    Associated Diagnoses: Ovarian mass      oxyCODONE (ROXICODONE) 5 MG tablet Take 1-2 tablets (5-10 mg) by mouth every 4 hours as needed for moderate to severe pain  Qty: 6 tablet, Refills: 0    Associated Diagnoses: Ovarian mass       !! - Potential duplicate medications found. Please discuss with provider.      CONTINUE these medications which have NOT CHANGED    Details   Aspirin 81 MG CAPS [ASPIRIN 81 MG CHEWABLE TABLET] Chew 81 mg daily.      atorvastatin (LIPITOR) 20 MG tablet TAKE ONE TABLET BY MOUTH ONE TIME DAILY  Qty: 90 tablet, Refills: 3    Associated Diagnoses: Hyperlipidemia LDL goal <100      calcium carbonate/vitamin D3 (CALCIUM 600 WITH VITAMIN D3 ORAL) Take 1 tablet by mouth daily (with breakfast)      cetirizine (ZYRTEC) 10 MG tablet Take 10 mg by mouth daily      !! ibuprofen (ADVIL/MOTRIN) 200 MG tablet Take 800 mg by mouth every 6 hours as needed for moderate pain      MULTIVITAMIN (MULTIPLE VITAMIN ORAL) [MULTIVITAMIN (MULTIPLE VITAMIN ORAL)] Take 1 tablet by mouth daily.      polyethylene glycol-propylene glycol (SYSTANE ULTRA) 0.4-0.3 % SOLN ophthalmic solution Place 1 drop into both eyes 3 times daily as needed for dry eyes      Probiotic Product (PROBIOTIC PO) Take 1 capsule  by mouth daily      pseudoephedrine (SUDAFED) 30 MG tablet [PSEUDOEPHEDRINE (SUDAFED) 30 MG TABLET] Take 30 mg by mouth every 4 (four) hours as needed for congestion.       !! - Potential duplicate medications found. Please discuss with provider.                Consultations:   No consultations were requested during this admission          Brief History of Illness:   Admitted for surgery, surgery without complications, postoperative course unremarkable             Hospital Course:   The patient's hospital course was unremarkable.  She recovered as anticipated and experienced no post-operative complications.           Discharge Instructions and Follow-Up:   Discharge diet: Regular   Discharge activity: No heavy lifting, pushing, pulling for 6 week(s)  No driving or operating machinery while on narcotic analgesics   Discharge follow-up: Follow up with Dr. Amador  in 1-2  weeks   Wound care May remove bandages in 3-5 days if still present              Discharge Disposition:   Discharged to home      Attestation:  I have reviewed today's vital signs, notes, medications, labs and imaging.    Mary Jane Key MD

## 2022-07-22 NOTE — PLAN OF CARE
"Goal Outcome Evaluation: pain management    Problem: Plan of Care - These are the overarching goals to be used throughout the patient stay.    Goal: Patient-Specific Goal (Individualized)  Description: You can add care plan individualizations to a care plan. Examples of Individualization might be:  \"Parent requests to be called daily at 9am for status\", \"I have a hard time hearing out of my right ear\", or \"Do not touch me to wake me up as it startles me\".  Outcome: Adequate for Care Transition   Pt tolerating reg diet. Voiding well.     Problem: Plan of Care - These are the overarching goals to be used throughout the patient stay.    Goal: Optimal Comfort and Wellbeing  Outcome: Adequate for Care Transition   Pt rating pain 1/10 given scheduled ibu  "

## 2022-07-25 NOTE — OP NOTE
Procedure Date: 07/21/2022    PREOPERATIVE DIAGNOSIS:  Left ovarian cyst, intracavitary polyp.    POSTOPERATIVE DIAGNOSIS:  Left ovarian cyst, intracavitary polyp.    SURGEON:  Jaki Amador MD    PROCEDURE:  Diagnostic laparoscopy, bilateral salpingo-oophorectomy, fractional dilatation and curettage, and hysteroscopy.    HISTORY:  The patient is a 67-year-old para 0 who presented to the Emergency Room on 7/18 with abdominal pain.  Ultrasound and CT verified a left ovarian cyst and a probable intracavitary polyp.  The patient's pain went away.  We then ordered an outpatient CA-125 which came back normal.  Risks, benefits, and alternatives reviewed, questions answered and permit signed to remove both tubes and ovaries, do a fractional D and C and hysteroscopy.    DESCRIPTION OF PROCEDURE:  The patient was taken to the operating room under general, placed in the dorsal lithotomy position, prepped and draped in the usual manner.  A timeout was undertaken.  [place] speculum was placed in the vagina and a single tooth tenaculum placed horizontally on the anterior lip of the cervix.  The cervix was opened and the sound was placed and rubber banded to the tenaculum.  The bivalve was removed and Trujillo catheter was placed.  We started with a medium bivalve but went to small because of a small vaginal opening.  Trujillo catheter placed.   Attention was placed above.  Adairs were placed on the umbilicus.  This was tented.  An infraumbilical skin incision was made.  The Veress needle was easily inserted with an opening pressure of 2 and approximately 2 L of CO2.  The Veress was withdrawn.  The incision was widened and a 5 mm nonbladed trocar was inserted and the scope was then placed, verifying entrance into the abdominal cavity.  The patient was placed in Trendelenburg and avascular area in the right lower quadrant was identified, infiltrated with 0.25% Marcaine with epinephrine.  Incision made and under direct visualization,  an 11 mm nonbladed trocar was inserted.  A 5 mm trocar was then inserted in the left lower quadrant after an avascular area was identified, infiltrated with 0.25% Marcaine with epinephrine, incision made and 5 mm trocar nonbladed inserted.  The pelvis was inspected.  Right tube and ovary were normal.  Right ovary was atrophic.  There was a rather large left ovarian cyst noted.  No excrescences and the outer capsule was smooth.  Washings were obtained and handed off.  The 5 mm LigaSure was then used on the right infundibulopelvic ligament, removing the right tube and ovary down to approximately 1 cm before the uterus, removing the right tube and ovary and ligating the ovarian suspensory ligament also.  This was placed in the posterior cul-de-sac.  The 5 mm LigaSure was then used on the left.  The infundibulopelvic ligament was double ligated, double ligating all down to the ovarian suspensory ligament, removing the left tube and ovary and again leaving approximately 1 cm before the uterus.  Excellent hemostasis was obtained.  A 10 mm bag was then inserted into the right lower quadrant port and the right tube and ovary were easily removed and handed off for path specimen.  Another 10 mm bag was inserted.  The left tube and ovary was inserted into this bag and brought to the surface.  This cyst was more solid.  I did try to drain with a needle, but there was minimal fluid, although there was some clear fluid obtained.  I then used a Kocher repeatedly to remove portions of the left ovary and tube in multiple sections covering 3 Telfas.  There was no leakage into the abdominal cavity.  I did at times, because of the firmness of the cyst, go in with a scalpel in the middle of the opening to avoid any obvious perforation.  Eventually, the whole left tube and ovary were removed in pieces with an intact bag, again with no spillage into the abdomen.  This was eventually handed off as path specimens.  The Lamar was  then used to close the fascia and peritoneum on the right lower quadrant incision with excellent reapproximation.  The subcutaneous fat was reapproximated with 3-0 plain and skin with 4-0 Monocryl.  The CO2 was released and again under direct visualization, the other trocars were removed, and the skin was then closed with 4-0 Vicryl.  30 mL of 0.25% plain Marcaine had been instilled before closure.   Attention was then placed below.  The sound was removed.  The speculum was replaced.  The cervix had been easily dilated to 7, and the scope was placed, verifying entrance into the uterine cavity.  Both ostia were noted.  There was some fundal redundant tissue, very scant, and on the right lateral wall.  The MyoSure was inserted, and this was removed.  The scope was removed.  Endocervical and endometrial curettings were then obtained.  The scope was replaced and the cavity was clean.  The tenaculum was removed, bivalve removed, Trujillo catheter was left in place and the patient was transferred to [facility] in stable condition.     PATH SPECIMENS:  Pelvic washings, right tube and ovary, left tube and ovary, endocervical curettings, endometrial curettings.    ESTIMATED BLOOD LOSS:  5 mL    DEFICIT:  45 mL    COMPLICATIONS:  None.      Jaki Amador MD        D: 2022   T: 2022   MT: [email or web address]    Name:     ELIUD GARNETT  MRN:      [id number]        Account:        [id number]   :      1955           Procedure Date: 2022     Document: [id number]    cc:  Remi Shelby

## 2022-07-26 LAB
PATH REPORT.ADDENDUM SPEC: NORMAL
PATH REPORT.COMMENTS IMP SPEC: NORMAL
PATH REPORT.FINAL DX SPEC: NORMAL
PATH REPORT.GROSS SPEC: NORMAL
PATH REPORT.MICROSCOPIC SPEC OTHER STN: NORMAL
PATH REPORT.RELEVANT HX SPEC: NORMAL
PHOTO IMAGE: NORMAL

## 2022-07-27 LAB
PATH REPORT.COMMENTS IMP SPEC: NORMAL
PATH REPORT.FINAL DX SPEC: NORMAL
PATH REPORT.GROSS SPEC: NORMAL
PATH REPORT.RELEVANT HX SPEC: NORMAL

## 2022-09-25 ENCOUNTER — HEALTH MAINTENANCE LETTER (OUTPATIENT)
Age: 67
End: 2022-09-25

## 2023-05-24 NOTE — TELEPHONE ENCOUNTER
Prior Authorization Request   Who s requesting:  Pharmacy  Pharmacy Name and Location: [facility]  Medication Name: VARENICLINE TARTRATE  Insurance Plan: PREFERREDONE  Key: [id number]   IV discontinued, cath removed intact

## 2023-06-20 ENCOUNTER — TELEPHONE (OUTPATIENT)
Dept: INTERNAL MEDICINE | Facility: CLINIC | Age: 68
End: 2023-06-20
Payer: COMMERCIAL

## 2023-06-20 NOTE — TELEPHONE ENCOUNTER
Left VM for Pt to CB and schedule AWV. If Pt calls back please assist in schedulling AWV with Dr Isiah Gallo.    Celi Chase

## 2023-08-06 ENCOUNTER — HEALTH MAINTENANCE LETTER (OUTPATIENT)
Age: 68
End: 2023-08-06

## 2023-09-08 DIAGNOSIS — E78.5 HYPERLIPIDEMIA LDL GOAL <100: ICD-10-CM

## 2023-09-08 RX ORDER — ATORVASTATIN CALCIUM 20 MG/1
TABLET, FILM COATED ORAL
Qty: 90 TABLET | Refills: 1 | Status: SHIPPED | OUTPATIENT
Start: 2023-09-08 | End: 2024-04-09

## 2023-09-08 NOTE — TELEPHONE ENCOUNTER
"Routing refill request to provider for review/approval because:  Labs not current:  LDL  Patient needs to be seen because it has been more than 1 year since last office visit.    Last Written Prescription Date:  07/05/2022  Last Fill Quantity: 90,  # refills: 3   Last office visit provider:  07/13/2022     Requested Prescriptions   Pending Prescriptions Disp Refills    atorvastatin (LIPITOR) 20 MG tablet [Pharmacy Med Name: Atorvastatin Calcium Oral Tablet 20 MG] 90 tablet 0     Sig: TAKE ONE TABLET BY MOUTH ONE TIME DAILY       Statins Protocol Failed - 9/8/2023 12:22 PM        Failed - LDL on file in past 12 months     Recent Labs   Lab Test 06/14/22  0834                Failed - Recent (12 mo) or future (30 days) visit within the authorizing provider's specialty     Patient has had an office visit with the authorizing provider or a provider within the authorizing providers department within the previous 12 mos or has a future within next 30 days. See \"Patient Info\" tab in inbasket, or \"Choose Columns\" in Meds & Orders section of the refill encounter.              Passed - No abnormal creatine kinase in past 12 months     No lab results found.             Passed - Medication is active on med list        Passed - Patient is age 18 or older        Passed - No active pregnancy on record        Passed - No positive pregnancy test in past 12 months             Magaly Gonzalez RN 09/08/23 12:22 PM  "

## 2023-10-30 ENCOUNTER — TRANSFERRED RECORDS (OUTPATIENT)
Dept: HEALTH INFORMATION MANAGEMENT | Facility: CLINIC | Age: 68
End: 2023-10-30
Payer: COMMERCIAL

## 2023-11-10 ENCOUNTER — TRANSFERRED RECORDS (OUTPATIENT)
Dept: HEALTH INFORMATION MANAGEMENT | Facility: CLINIC | Age: 68
End: 2023-11-10
Payer: COMMERCIAL

## 2023-11-13 ENCOUNTER — OFFICE VISIT (OUTPATIENT)
Dept: FAMILY MEDICINE | Facility: CLINIC | Age: 68
End: 2023-11-13
Payer: COMMERCIAL

## 2023-11-13 VITALS
SYSTOLIC BLOOD PRESSURE: 135 MMHG | OXYGEN SATURATION: 99 % | TEMPERATURE: 97.9 F | HEART RATE: 71 BPM | WEIGHT: 199.2 LBS | HEIGHT: 70 IN | DIASTOLIC BLOOD PRESSURE: 84 MMHG | BODY MASS INDEX: 28.52 KG/M2 | RESPIRATION RATE: 16 BRPM

## 2023-11-13 DIAGNOSIS — Z72.0 TOBACCO ABUSE: ICD-10-CM

## 2023-11-13 DIAGNOSIS — M54.16 LUMBAR RADICULOPATHY: ICD-10-CM

## 2023-11-13 DIAGNOSIS — Z01.818 PRE-OPERATIVE GENERAL PHYSICAL EXAMINATION: Primary | ICD-10-CM

## 2023-11-13 DIAGNOSIS — Z23 NEED FOR PNEUMOCOCCAL VACCINE: ICD-10-CM

## 2023-11-13 DIAGNOSIS — M41.35 THORACOGENIC SCOLIOSIS OF THORACOLUMBAR REGION: ICD-10-CM

## 2023-11-13 DIAGNOSIS — R09.82 POST-NASAL DRIP: ICD-10-CM

## 2023-11-13 DIAGNOSIS — Z29.11 NEED FOR PROPHYLACTIC VACCINATION AND INOCULATION AGAINST RESPIRATORY SYNCYTIAL VIRUS (RSV): ICD-10-CM

## 2023-11-13 DIAGNOSIS — E78.5 HYPERLIPIDEMIA LDL GOAL <100: ICD-10-CM

## 2023-11-13 DIAGNOSIS — Z23 NEED FOR COVID-19 VACCINE: ICD-10-CM

## 2023-11-13 DIAGNOSIS — M51.379 DEGENERATION OF LUMBAR OR LUMBOSACRAL INTERVERTEBRAL DISC: ICD-10-CM

## 2023-11-13 DIAGNOSIS — Z87.898 HISTORY OF CHRONIC COUGH: ICD-10-CM

## 2023-11-13 DIAGNOSIS — Z23 NEED FOR PROPHYLACTIC VACCINATION AND INOCULATION AGAINST INFLUENZA: ICD-10-CM

## 2023-11-13 PROBLEM — R10.32 ABDOMINAL PAIN, LEFT LOWER QUADRANT: Status: RESOLVED | Noted: 2022-07-16 | Resolved: 2023-11-13

## 2023-11-13 PROBLEM — N83.8 OVARIAN MASS: Status: RESOLVED | Noted: 2022-07-16 | Resolved: 2023-11-13

## 2023-11-13 PROBLEM — R05.3 CHRONIC COUGH: Status: RESOLVED | Noted: 2019-10-24 | Resolved: 2023-11-13

## 2023-11-13 LAB
BASOPHILS # BLD AUTO: 0.1 10E3/UL (ref 0–0.2)
BASOPHILS NFR BLD AUTO: 1 %
EOSINOPHIL # BLD AUTO: 0.4 10E3/UL (ref 0–0.7)
EOSINOPHIL NFR BLD AUTO: 5 %
ERYTHROCYTE [DISTWIDTH] IN BLOOD BY AUTOMATED COUNT: 12.7 % (ref 10–15)
HBA1C MFR BLD: 5.1 % (ref 0–5.6)
HCT VFR BLD AUTO: 43 % (ref 35–47)
HGB BLD-MCNC: 14.4 G/DL (ref 11.7–15.7)
IMM GRANULOCYTES # BLD: 0 10E3/UL
IMM GRANULOCYTES NFR BLD: 0 %
LYMPHOCYTES # BLD AUTO: 1.5 10E3/UL (ref 0.8–5.3)
LYMPHOCYTES NFR BLD AUTO: 18 %
MCH RBC QN AUTO: 30 PG (ref 26.5–33)
MCHC RBC AUTO-ENTMCNC: 33.5 G/DL (ref 31.5–36.5)
MCV RBC AUTO: 90 FL (ref 78–100)
MONOCYTES # BLD AUTO: 0.7 10E3/UL (ref 0–1.3)
MONOCYTES NFR BLD AUTO: 9 %
NEUTROPHILS # BLD AUTO: 5.8 10E3/UL (ref 1.6–8.3)
NEUTROPHILS NFR BLD AUTO: 68 %
PLATELET # BLD AUTO: 305 10E3/UL (ref 150–450)
RBC # BLD AUTO: 4.8 10E6/UL (ref 3.8–5.2)
WBC # BLD AUTO: 8.5 10E3/UL (ref 4–11)

## 2023-11-13 PROCEDURE — 90472 IMMUNIZATION ADMIN EACH ADD: CPT | Performed by: FAMILY MEDICINE

## 2023-11-13 PROCEDURE — 90677 PCV20 VACCINE IM: CPT | Performed by: FAMILY MEDICINE

## 2023-11-13 PROCEDURE — 85025 COMPLETE CBC W/AUTO DIFF WBC: CPT | Performed by: FAMILY MEDICINE

## 2023-11-13 PROCEDURE — 90662 IIV NO PRSV INCREASED AG IM: CPT | Performed by: FAMILY MEDICINE

## 2023-11-13 PROCEDURE — 90471 IMMUNIZATION ADMIN: CPT | Performed by: FAMILY MEDICINE

## 2023-11-13 PROCEDURE — 90480 ADMN SARSCOV2 VAC 1/ONLY CMP: CPT | Performed by: FAMILY MEDICINE

## 2023-11-13 PROCEDURE — 93000 ELECTROCARDIOGRAM COMPLETE: CPT | Performed by: FAMILY MEDICINE

## 2023-11-13 PROCEDURE — 83036 HEMOGLOBIN GLYCOSYLATED A1C: CPT | Performed by: FAMILY MEDICINE

## 2023-11-13 PROCEDURE — 91320 SARSCV2 VAC 30MCG TRS-SUC IM: CPT | Performed by: FAMILY MEDICINE

## 2023-11-13 PROCEDURE — 36415 COLL VENOUS BLD VENIPUNCTURE: CPT | Performed by: FAMILY MEDICINE

## 2023-11-13 PROCEDURE — 90678 RSV VACC PREF BIVALENT IM: CPT | Performed by: FAMILY MEDICINE

## 2023-11-13 PROCEDURE — 80053 COMPREHEN METABOLIC PANEL: CPT | Performed by: FAMILY MEDICINE

## 2023-11-13 PROCEDURE — 99215 OFFICE O/P EST HI 40 MIN: CPT | Mod: 25 | Performed by: FAMILY MEDICINE

## 2023-11-13 ASSESSMENT — PAIN SCALES - GENERAL: PAINLEVEL: MODERATE PAIN (5)

## 2023-11-13 NOTE — PATIENT INSTRUCTIONS
Labs and diagnostics today   If stable will clear for surgery otherwise may need additional work up   Take no meds am of surgery  Hold aspirin, antiinflammatories like motrin aleve etc, fish oil and glucosamine preferable 5 to 7 days prior to surgery  Ideally avoid smoking 1 week before surgery   Will fax /send note to surgeon once completed   Flu shot, prevnar 20 , COVID & rsv vaccine given today   Consider ct lung cancer screening  Consider Lung function tests for possible COPD  Continue routine care with PCP Dr Isiah Gallo     Preparing for Your Surgery  Getting started  A nurse will call you to review your health history and instructions. They will give you an arrival time based on your scheduled surgery time. Please be ready to share:  Your doctor's clinic name and phone number  Your medical, surgical, and anesthesia history  A list of allergies and sensitivities  A list of medicines, including herbal treatments and over-the-counter drugs  Whether the patient has a legal guardian (ask how to send us the papers in advance)  Please tell us if you're pregnant--or if there's any chance you might be pregnant. Some surgeries may injure a fetus (unborn baby), so they require a pregnancy test. Surgeries that are safe for a fetus don't always need a test, and you can choose whether to have one.   If you have a child who's having surgery, please ask for a copy of Preparing for Your Child's Surgery.    Preparing for surgery  Within 10 to 30 days of surgery: Have a pre-op exam (sometimes called an H&P, or History and Physical). This can be done at a clinic or pre-operative center.  If you're having a , you may not need this exam. Talk to your care team.  At your pre-op exam, talk to your care team about all medicines you take. If you need to stop any medicines before surgery, ask when to start taking them again.  We do this for your safety. Many medicines can make you bleed too much during surgery. Some change  how well surgery (anesthesia) drugs work.  Call your insurance company to let them know you're having surgery. (If you don't have insurance, call [phone].)  Call your clinic if there's any change in your health. This includes signs of a cold or flu (sore throat, runny nose, cough, rash, fever). It also includes a scrape or scratch near the surgery site.  If you have questions on the day of surgery, call your hospital or surgery center.  Eating and drinking guidelines  For your safety: Unless your surgeon tells you otherwise, follow the guidelines below.  Eat and drink as usual until 8 hours before you arrive for surgery. After that, no food or milk.  Drink clear liquids until 2 hours before you arrive. These are liquids you can see through, like water, Gatorade, and Propel Water. They also include plain black coffee and tea (no cream or milk), candy, and breath mints. You can spit out gum when you arrive.  If you drink alcohol: Stop drinking it the night before surgery.  If your care team tells you to take medicine on the morning of surgery, it's okay to take it with a sip of water.  Preventing infection  Shower or bathe the night before and morning of your surgery. Follow the instructions your clinic gave you. (If no instructions, use regular soap.)  Don't shave or clip hair near your surgery site. We'll remove the hair if needed.  Don't smoke or vape the morning of surgery. You may chew nicotine gum up to 2 hours before surgery. A nicotine patch is okay.  Note: Some surgeries require you to completely quit smoking and nicotine. Check with your surgeon.  Your care team will make every effort to keep you safe from infection. We will:  Clean our hands often with soap and water (or an alcohol-based hand rub).  Clean the skin at your surgery site with a special soap that kills germs.  Give you a special gown to keep you warm. (Cold raises the risk of infection.)  Wear special hair covers, masks, gowns and gloves  during surgery.  Give antibiotic medicine, if prescribed. Not all surgeries need antibiotics.  What to bring on the day of surgery  Photo ID and insurance card  Copy of your health care directive, if you have one  Glasses and hearing aids (bring cases)  You can't wear contacts during surgery  Inhaler and eye drops, if you use them (tell us about these when you arrive)  CPAP machine or breathing device, if you use them  A few personal items, if spending the night  If you have . . .  A pacemaker, ICD (cardiac defibrillator) or other implant: Bring the ID card.  An implanted stimulator: Bring the remote control.  A legal guardian: Bring a copy of the certified (court-stamped) guardianship papers.  Please remove any jewelry, including body piercings. Leave jewelry and other valuables at home.  If you're going home the day of surgery  You must have a responsible adult drive you home. They should stay with you overnight as well.  If you don't have someone to stay with you, and you aren't safe to go home alone, we may keep you overnight. Insurance often won't pay for this.  After surgery  If it's hard to control your pain or you need more pain medicine, please call your surgeon's office.  Questions?   If you have any questions for your care team, list them here: _________________________________________________________________________________________________________________________________________________________________________ ____________________________________ ____________________________________ ____________________________________  For informational purposes only. Not to replace the advice of your health care provider. Copyright   2003, 2019 [facility]. All rights reserved. Clinically reviewed by Lynn De La Cruz MD. [employer] [id number] - REV 12/22.    How to Take Your Medication Before Surgery  - STOP taking all vitamins and herbal supplements 14 days before surgery. &see above

## 2023-11-13 NOTE — PROGRESS NOTES
[address]  [address]  [place] [address]  Phone: [phone]  Fax: [phone]  Primary Provider: Isiah Gallo  Pre-op Performing Provider: EZEQUIEL VINCENT    PREOPERATIVE EVALUATION:  Today's date: 11/13/2023    Anat is a 68 year old female who presents for a preoperative evaluation.      11/13/2023     1:33 PM   Additional Questions   Roomed by Sadie Mejia   Accompanied by Self       Surgical Information:  Surgery/Procedure: Back Surgery   Surgery Location: [facility]  Surgeon: Dr. Jose Brizuela   Surgery Date: 11/22/2023 estimated   Time of Surgery: TBD  Where patient plans to recover: At home with family  Fax number for surgical facility: [phone]    Assessment & Plan     The proposed surgical procedure is considered INTERMEDIATE risk.    Pre-operative general physical examination  Lumbar radiculopathy  Lumbar Disc Degeneration  Thoracogenic scoliosis of thoracolumbar region  Here today for preop for possible L4-5 microdiscectomy for disc bulge and impingement contributing to resolved right sciatic and right leg numbness.  Surgery tentatively set for 11/22.  New to this provider.  Labs and diagnostics today   If stable will clear for surgery otherwise may need additional work up   EKG shows normal sinus rhythm similar to prior, no changes noted  -Normal red blood cell (Hgb) levels, normal white blood cell count and normal platelet levels.  -A1C (diabetic test) is normal  CMP is not back.  Take no meds am of surgery  Hold aspirin, antiinflammatories like motrin aleve etc, fish oil and glucosamine preferable 5 to 7 days prior to surgery  Ideally avoid smoking 1 week before surgery   Will fax /send note to surgeon once completed   Flu shot, Prevnar 20 , COVID & rsv vaccine given today   Consider ct lung cancer screening  Consider Lung function tests for possible COPD  Continue routine care with PCP   Isiah Gallo   - EKG 12-lead complete w/read - Clinics  - CBC with platelets and differential; Future  - Comprehensive metabolic panel (BMP + Alb, Alk Phos, ALT, AST, Total. Bili, TP); Future  - Hemoglobin A1c; Future  - CBC with platelets and differential  - Comprehensive metabolic panel (BMP + Alb, Alk Phos, ALT, AST, Total. Bili, TP)  - Hemoglobin A1c    Tobacco abuse  Post-nasal drip  History of chronic cough  Counseled that smoking increases risk of perioperative morbidity mortality and increased risk of pneumonia.  Encouraged this would be a good time to quit smoking altogether especially given history of chronic bronchitis.  Ideally avoid smoking 1 week before surgery.  Is on Zyrtec daily for postnasal drip and history of chronic cough.  Currently declines need for inhalers.  Flu shot, Prevnar 20 , COVID & rsv vaccine given today. Consider ct lung cancer screening. Consider Lung function tests for possible COPD.  Continue routine care with PCP Dr. Isiah Gallo.  - EKG 12-lead complete w/read - Clinics  - CBC with platelets and differential; Future  - CBC with platelets and differential    Hyperlipidemia LDL goal <100  On atorvastatin.  Stress test last year was normal.  EKG currently shows no change from before.  Prior to back pain starting 2 weeks ago reported functional METS of more than 4.  - EKG 12-lead complete w/read - Clinics  - Comprehensive metabolic panel (BMP + Alb, Alk Phos, ALT, AST, Total. Bili, TP); Future  - Comprehensive metabolic panel (BMP + Alb, Alk Phos, ALT, AST, Total. Bili, TP)    BMI 28.0-28.9,adult  Does increase risk of morbidity and mortality.  Monitor for perioperative hypoxia unknown snoring history.    Need for prophylactic vaccination and inoculation against influenza  - INFLUENZA VACCINE 65+ (FLUZONE HD)    Need for COVID-19 vaccine  - COVID-19 12+ (2023-24) (PFIZER)    Need for pneumococcal vaccine  - PNEUMOCOCCAL 20 VALENT CONJUGATE (PREVNAR 20)    Need for prophylactic  vaccination and inoculation against respiratory syncytial virus (RSV)  - RSV VACCINE (ABRYSVO)      Risks and Recommendations:  The patient has the following additional risks and recommendations for perioperative complications:   - No identified additional risk factors other than previously addressed    Antiplatelet or Anticoagulation Medication Instructions:   - Patient is on no antiplatelet or anticoagulation medications.    Additional Medication Instructions:  Hold all meds morning of surgery    RECOMMENDATION:  APPROVAL GIVEN to proceed with proposed procedure, without further diagnostic evaluation.    Review of the result(s) of each unique test - diagnostics in epic  Independent interpretation of a test performed by another physician/other qualified health care professional (not separately reported) - prior PCP notes  Diagnosis or treatment significantly limited by social determinants of health - new to this provider limited time  Ordering of each unique test  40 minutes spent by me on the date of the encounter doing chart review, history and exam, documentation and further activities per the note      Subjective     HPI related to upcoming procedure: Today for preop for back surgery.  New to this provider in clinic.  Reports disc pressing nerve on back causing right leg sciatica and after 5 days having numbness going on 2 weeks. Known Lumbar DDD, seen on MRI told L4 - 5  level.          11/12/2023     2:40 PM   Preop Questions   1. Have you ever had a heart attack or stroke? No   2. Have you ever had surgery on your heart or blood vessels, such as a stent placement, a coronary artery bypass, or surgery on an artery in your head, neck, heart, or legs? No   3. Do you have chest pain with activity? No   4. Do you have a history of  heart failure? No   5. Do you currently have a cold, bronchitis or symptoms of other infection? YES - chronic post nasal drainage going on years   6. Do you have a cough, shortness of  breath, or wheezing? YES - chronic cough due to chronic post nasal drainage , uses zyrtec , and prn sudafed, not on inhalers, hx of smoking   7. Do you or anyone in your family have previous history of blood clots? No   8. Do you or does anyone in your family have a serious bleeding problem such as prolonged bleeding following surgeries or cuts? No   9. Have you ever had problems with anemia or been told to take iron pills? No   10. Have you had any abnormal blood loss such as black, tarry or bloody stools, or abnormal vaginal bleeding? No   11. Have you ever had a blood transfusion? No   12. Are you willing to have a blood transfusion if it is medically needed before, during, or after your surgery? Yes   13. Have you or any of your relatives ever had problems with anesthesia? No   14. Do you have sleep apnea, excessive snoring or daytime drowsiness? No   15. Do you have any artifical heart valves or other implanted medical devices like a pacemaker, defibrillator, or continuous glucose monitor? No   16. Do you have artificial joints? No   17. Are you allergic to latex? No     68-year-old, history of smoking, chronic cough and postnasal drip, possible COPD/chronic bronchitis, thoraco lumbar scoliosis, lumbar DDD, right lumbar radiculopathy, prior epidural and remote L5 microdiscectomy possibly at a different location, history of hyperlipidemia on atorvastatin, BMI 29, history of ovarian mass s/p lap bilateral BSO in July 2022 turned out to be benign, allergic to sulfa under care of PCP Dr. Isiah Gallo, seen by [facility] orth[facility] 10/23/2023 for back pain right lumbar radiculopathy MRI ordered, and scheduled for surgery in 1 week or so.  No records available.  [place]  negative.    Stress test 6/2023  Baseline electrocardiogram demonstrates sinus rhythm.   The stress electrocardiogram was abnormal.  There is 1.0 mm of horizontal ST segment depression in the II, III and aVF leads, beginning at 4.11 minutes of stress,  and returning to baseline after 5.59 minutes into the recovery phase.  There were no arrhythmias during stress.  Arrhythmias during recovery: Frequent bigeminy PVCs.  The patient's exercise capacity is average, achieving 5:05 on a Ciro Protocol. Exercise was terminated due to fatigue.    Ischemic ECG changes were noted at peak exercise. Additionally, frequent PVCs noted during recovery that settled down 5 minutes into recovery.    Nuclear stress imaging is negative for inducible myocardial ischemia or infarction.    The left ventricular ejection fraction at stress is greater than 70%.    The patient is at a low risk of future cardiac ischemic events.    There is no prior study for comparison.        Health Care Directive:  Patient does not have a Health Care Directive or Living Will: Discussed advance care planning with patient; information given to patient to review.    Preoperative Review of :   reviewed - no record of controlled substances prescribed.    Status of Chronic Conditions:  HYPERLIPIDEMIA - Patient has a long history of significant Hyperlipidemia requiring medication for treatment with recent good control. Patient reports no problems or side effects with the medication.     Review of Systems  Constitutional, neuro, ENT, endocrine, pulmonary, cardiac, gastrointestinal, genitourinary, musculoskeletal, integument and psychiatric systems are negative, except as otherwise noted.    Patient Active Problem List    Diagnosis Date Noted    Thoracogenic scoliosis of thoracolumbar region 10/24/2019     Priority: Medium    Hyperlipidemia LDL goal <100 10/24/2019     Priority: Medium    Lumbar radiculopathy 10/24/2019     Priority: Medium    Tobacco abuse 10/24/2019     Priority: Medium    Lumbar Disc Degeneration      Priority: Medium     Created by Conversion          Past Medical History:   Diagnosis Date    Ovarian mass 07/16/2022     Past Surgical History:   Procedure Laterality Date    DILATION AND  CURETTAGE, HYSTEROSCOPY WITH ULTRASOUND GUIDANCE N/A 2022    Procedure: HYSTEROSCOPY DILATION CURETTAGE;  Surgeon: Jaki Amador MD;  Location: [facility] Main OR    IR LUMBAR EPIDURAL STEROID INJECTION  10/12/2004    LAPAROSCOPIC SALPINGECTOMY Bilateral 2022    Procedure: LAPAROSCOPY BILATERAL SALPINGO OOPHORECTOMY;  Surgeon: Jaki Amador MD;  Location: [facility] Main OR    OTHER SURGICAL HISTORY  2015    L5 Radiculopathy     Current Outpatient Medications   Medication Sig Dispense Refill    atorvastatin (LIPITOR) 20 MG tablet TAKE ONE TABLET BY MOUTH ONE TIME DAILY 90 tablet 1    calcium carbonate/vitamin D3 (CALCIUM 600 WITH VITAMIN D3 ORAL) Take 1 tablet by mouth daily (with breakfast)      cetirizine (ZYRTEC) 10 MG tablet Take 10 mg by mouth daily      MULTIVITAMIN (MULTIPLE VITAMIN ORAL) [MULTIVITAMIN (MULTIPLE VITAMIN ORAL)] Take 1 tablet by mouth daily.      Probiotic Product (PROBIOTIC PO) Take 1 capsule by mouth daily         Allergies   Allergen Reactions    Sulfa (Sulfonamide Antibiotics) [Sulfa Antibiotics] Swelling        Social History     Tobacco Use    Smoking status: Every Day     Packs/day: 1     Types: Cigarettes    Smokeless tobacco: Never   Substance Use Topics    Alcohol use: Yes     Alcohol/week: 5.8 standard drinks of alcohol     Family History   Problem Relation Age of Onset    Heart Disease Father     Coronary Artery Disease Father         fatal heart attack age 54    Heart Disease Brother     Breast Cancer Niece 30         at 32    Osteoporosis Mother     Coronary Artery Disease Sister         heart attack age 63    Hypertension Sister     Hyperlipidemia Sister     Coronary Artery Disease Brother         heart attack age 51    Hypertension Sister     Hyperlipidemia Sister     Thyroid Disease Sister     Other Cancer Brother         Bladder cancer     History   Drug Use No         Objective     /84 (BP Location: Right arm, Patient Position: Sitting,  "Cuff Size: Adult Regular)   Pulse 71   Temp 97.9  F (36.6  C) (Temporal)   Resp 16   Ht 1.765 m (5' 9.5\")   Wt 90.4 kg (199 lb 3.2 oz)   LMP  (LMP Unknown)   SpO2 99%   Breastfeeding No   BMI 28.99 kg/m      Physical Exam    GENERAL APPEARANCE: healthy, alert and no distress     EYES: EOMI, PERRL, glasses     HENT: ear canals and TM's normal and nose and mouth without ulcers or lesions     NECK: no adenopathy, no asymmetry, masses, or scars and thyroid normal to palpation     RESP: lungs clear to auscultation - no rales, rhonchi or wheezes     CV: regular rates and rhythm, normal S1 S2, no S3 or S4 and no murmur, click or rub     ABDOMEN:  soft, non tender, no HSM or masses and bowel sounds normal     MS: extremities normal- no gross deformities noted, no evidence of inflammation in joints, FROM in all extremities.  Stiffness with moving, numbness in legs makes it easy to bump into things if not careful     SKIN: no suspicious lesions or rashes     NEURO: Normal strength and tone, sensory exam grossly normal, mentation intact and speech normal     PSYCH: mentation appears normal. and affect normal/bright     LYMPHATICS: No cervical adenopathy    Recent Labs   Lab Test 07/21/22  1353 07/16/22  0741 06/14/22  0834   HGB 14.6 14.6 14.5   PLT  --  287 293   NA  --  141 142   POTASSIUM  --  3.6 4.4   CR  --  0.80 0.79   A1C  --   --  5.3        Diagnostics:  Recent Results (from the past 48 hour(s))   Hemoglobin A1c    Collection Time: 11/13/23  3:00 PM   Result Value Ref Range    Hemoglobin A1C 5.1 0.0 - 5.6 %   CBC with platelets and differential    Collection Time: 11/13/23  3:00 PM   Result Value Ref Range    WBC Count 8.5 4.0 - 11.0 10e3/uL    RBC Count 4.80 3.80 - 5.20 10e6/uL    Hemoglobin 14.4 11.7 - 15.7 g/dL    Hematocrit 43.0 35.0 - 47.0 %    MCV 90 78 - 100 fL    MCH 30.0 26.5 - 33.0 pg    MCHC 33.5 31.5 - 36.5 g/dL    RDW 12.7 10.0 - 15.0 %    Platelet Count 305 150 - 450 10e3/uL    % Neutrophils 68 " %    % Lymphocytes 18 %    % Monocytes 9 %    % Eosinophils 5 %    % Basophils 1 %    % Immature Granulocytes 0 %    Absolute Neutrophils 5.8 1.6 - 8.3 10e3/uL    Absolute Lymphocytes 1.5 0.8 - 5.3 10e3/uL    Absolute Monocytes 0.7 0.0 - 1.3 10e3/uL    Absolute Eosinophils 0.4 0.0 - 0.7 10e3/uL    Absolute Basophils 0.1 0.0 - 0.2 10e3/uL    Absolute Immature Granulocytes 0.0 <=0.4 10e3/uL      EKG: appears normal, NSR, normal axis, normal intervals, no acute ST/T changes c/w ischemia, no LVH by voltage criteria, unchanged from previous tracings  EKG shows normal sinus rhythm similar to prior, no changes noted  -Normal red blood cell (Hgb) levels, normal white blood cell count and normal platelet levels.  -A1C (diabetic test) is normal and indicates that your blood sugar has been in a normal range the last 3 months.  Revised Cardiac Risk Index (RCRI):  The patient has the following serious cardiovascular risks for perioperative complications:   - No serious cardiac risks = 0 points     RCRI Interpretation: 0 points: Class I (very low risk - 0.4% complication rate)         Signed Electronically by: Chiqui Solitario MD  Copy of this evaluation report is provided to requesting physician.

## 2023-11-14 LAB
ALBUMIN SERPL BCG-MCNC: 4.3 G/DL (ref 3.5–5.2)
ALP SERPL-CCNC: 116 U/L (ref 35–104)
ALT SERPL W P-5'-P-CCNC: 47 U/L (ref 0–50)
ANION GAP SERPL CALCULATED.3IONS-SCNC: 10 MMOL/L (ref 7–15)
AST SERPL W P-5'-P-CCNC: 21 U/L (ref 0–45)
BILIRUB SERPL-MCNC: 0.2 MG/DL
BUN SERPL-MCNC: 14.6 MG/DL (ref 8–23)
CALCIUM SERPL-MCNC: 9.4 MG/DL (ref 8.8–10.2)
CHLORIDE SERPL-SCNC: 106 MMOL/L (ref 98–107)
CREAT SERPL-MCNC: 0.82 MG/DL (ref 0.51–0.95)
DEPRECATED HCO3 PLAS-SCNC: 27 MMOL/L (ref 22–29)
EGFRCR SERPLBLD CKD-EPI 2021: 77 ML/MIN/1.73M2
GLUCOSE SERPL-MCNC: 95 MG/DL (ref 70–99)
POTASSIUM SERPL-SCNC: 4.7 MMOL/L (ref 3.4–5.3)
PROT SERPL-MCNC: 6.9 G/DL (ref 6.4–8.3)
SODIUM SERPL-SCNC: 143 MMOL/L (ref 135–145)

## 2023-12-08 ENCOUNTER — TRANSFERRED RECORDS (OUTPATIENT)
Dept: HEALTH INFORMATION MANAGEMENT | Facility: CLINIC | Age: 68
End: 2023-12-08
Payer: COMMERCIAL

## 2024-01-09 ENCOUNTER — TRANSFERRED RECORDS (OUTPATIENT)
Dept: HEALTH INFORMATION MANAGEMENT | Facility: CLINIC | Age: 69
End: 2024-01-09
Payer: COMMERCIAL

## 2024-03-02 ENCOUNTER — HEALTH MAINTENANCE LETTER (OUTPATIENT)
Age: 69
End: 2024-03-02

## 2024-04-09 DIAGNOSIS — E78.5 HYPERLIPIDEMIA LDL GOAL <100: ICD-10-CM

## 2024-04-09 RX ORDER — ATORVASTATIN CALCIUM 20 MG/1
TABLET, FILM COATED ORAL
Qty: 90 TABLET | Refills: 1 | Status: SHIPPED | OUTPATIENT
Start: 2024-04-09 | End: 2024-10-03

## 2024-09-28 ENCOUNTER — HEALTH MAINTENANCE LETTER (OUTPATIENT)
Age: 69
End: 2024-09-28

## 2024-10-03 DIAGNOSIS — E78.5 HYPERLIPIDEMIA LDL GOAL <100: ICD-10-CM

## 2024-10-03 RX ORDER — ATORVASTATIN CALCIUM 20 MG/1
TABLET, FILM COATED ORAL
Qty: 90 TABLET | Refills: 1 | Status: SHIPPED | OUTPATIENT
Start: 2024-10-03

## 2025-04-01 DIAGNOSIS — E78.5 HYPERLIPIDEMIA LDL GOAL <100: ICD-10-CM

## 2025-04-01 RX ORDER — ATORVASTATIN CALCIUM 20 MG/1
20 TABLET, FILM COATED ORAL
Qty: 90 TABLET | Refills: 3 | Status: SHIPPED | OUTPATIENT
Start: 2025-04-01

## 2025-05-20 ENCOUNTER — TRANSFERRED RECORDS (OUTPATIENT)
Dept: HEALTH INFORMATION MANAGEMENT | Facility: CLINIC | Age: 70
End: 2025-05-20
Payer: COMMERCIAL

## (undated) DEVICE — Device

## (undated) DEVICE — ESU LIGASURE LAPAROSCOPIC BLUNT TIP SEALER 5MMX37CM LF1837

## (undated) DEVICE — SYR 10ML FINGER CONTROL W/O NDL 309695

## (undated) DEVICE — GOWN XLG DISP 9545

## (undated) DEVICE — ENDO POUCH UNIV RETRIEVAL SYSTEM INZII 10MM CD001

## (undated) DEVICE — TUBING LAP SUCT/IRRIG STRYKER 250070500

## (undated) DEVICE — ENDO SHEARS RENEW LAP ENDOCUT SCISSOR TIP 16.5MM 3142

## (undated) DEVICE — PREP CHLORAPREP 26ML TINTED HI-LITE ORANGE 930815

## (undated) DEVICE — TUBING SMOKE EVAC PNEUMOCLEAR HIGH FLOW 0620050250

## (undated) DEVICE — SU MONOCRYL+ 4-0 18IN PS2 UND MCP496G

## (undated) DEVICE — SUCTION MANIFOLD NEPTUNE 2 SYS 1 PORT 702-025-000

## (undated) DEVICE — SUTURE PASSOR W/GUIDE RSG-14F-4-WG

## (undated) DEVICE — ADH SKIN CLOSURE PREMIERPRO EXOFIN 1.0ML 3470

## (undated) DEVICE — NEEDLE SPINAL DISP 22GA X 3.5" QUINCKE 333320

## (undated) DEVICE — CATH FOLEY 5CC 16FR SIL/LTX 0165V16S

## (undated) DEVICE — SOL NACL 0.9% IRRIG 1000ML BOTTLE 2F7124

## (undated) DEVICE — DRSG TELFA 3X4" 1050

## (undated) DEVICE — GLOVE UNDER INDICATOR PI SZ 7.0 LF 41670

## (undated) DEVICE — ENDO TROCAR FIRST ENTRY KII FIOS Z-THRD 05X100MM CTF03

## (undated) DEVICE — DRESSING COVERLET STRIP 3/4 X 3 LF 230

## (undated) DEVICE — PREP SCRUB SOL EXIDINE 4% CHG 4OZ 29002-404

## (undated) DEVICE — DRSG TELFA 3X8" 1238

## (undated) DEVICE — PLATE GROUNDING ADULT W/CORD 9165L

## (undated) DEVICE — ENDO TROCAR FIRST ENTRY KII FIOS Z-THRD 11X100MM CTF33

## (undated) DEVICE — KIT PROCEDURE FLUENT IN/OUT FLOWPAK TISS TRAP FLT-112S

## (undated) DEVICE — SOL WATER IRRIG 1000ML BOTTLE 2F7114

## (undated) DEVICE — ENDO TROCAR SLEEVE KII Z-THREADED 05X100MM CTS02

## (undated) DEVICE — SYRINGE 10ML FILL SALINE FLUSH STERILE 306553

## (undated) DEVICE — GLOVE SURG PI ULTRA TOUCH M SZ 6-1/2 LF

## (undated) DEVICE — A3 SUPPLIES- SEE NURSING INFO PAGE

## (undated) DEVICE — TUBING INSUFFLATION W/FILTER 28-0207

## (undated) DEVICE — SU PLAIN 4-0 FS-2 27" H821H

## (undated) DEVICE — TUBING SUCTION MEDI-VAC 1/4"X20' N620A

## (undated) DEVICE — CUSTOM PACK PELVISCOPY SMA5BPVHEA

## (undated) DEVICE — SOLUTION IRRIG 2B7127 .9NS 3000ML BAG

## (undated) DEVICE — BRIEF STRETCH XL MPS40

## (undated) RX ORDER — PROPOFOL 10 MG/ML
INJECTION, EMULSION INTRAVENOUS
Status: DISPENSED
Start: 2022-07-21

## (undated) RX ORDER — FENTANYL CITRATE 50 UG/ML
INJECTION, SOLUTION INTRAMUSCULAR; INTRAVENOUS
Status: DISPENSED
Start: 2022-07-16

## (undated) RX ORDER — FENTANYL CITRATE 50 UG/ML
INJECTION, SOLUTION INTRAMUSCULAR; INTRAVENOUS
Status: DISPENSED
Start: 2022-07-21

## (undated) RX ORDER — ONDANSETRON 2 MG/ML
INJECTION INTRAMUSCULAR; INTRAVENOUS
Status: DISPENSED
Start: 2022-07-21

## (undated) RX ORDER — CEFAZOLIN SODIUM 1 G/3ML
INJECTION, POWDER, FOR SOLUTION INTRAMUSCULAR; INTRAVENOUS
Status: DISPENSED
Start: 2022-07-21

## (undated) RX ORDER — DEXAMETHASONE SODIUM PHOSPHATE 10 MG/ML
INJECTION, SOLUTION INTRAMUSCULAR; INTRAVENOUS
Status: DISPENSED
Start: 2022-07-21

## (undated) RX ORDER — PROPOFOL 10 MG/ML
INJECTION, EMULSION INTRAVENOUS
Status: DISPENSED
Start: 2022-07-16

## (undated) RX ORDER — EPHEDRINE SULFATE 50 MG/ML
INJECTION, SOLUTION INTRAMUSCULAR; INTRAVENOUS; SUBCUTANEOUS
Status: DISPENSED
Start: 2022-07-21

## (undated) RX ORDER — LIDOCAINE HYDROCHLORIDE 10 MG/ML
INJECTION, SOLUTION EPIDURAL; INFILTRATION; INTRACAUDAL; PERINEURAL
Status: DISPENSED
Start: 2022-07-21